# Patient Record
Sex: FEMALE | Race: WHITE | NOT HISPANIC OR LATINO | Employment: OTHER | URBAN - METROPOLITAN AREA
[De-identification: names, ages, dates, MRNs, and addresses within clinical notes are randomized per-mention and may not be internally consistent; named-entity substitution may affect disease eponyms.]

---

## 2017-01-02 ENCOUNTER — HOSPITAL ENCOUNTER (OUTPATIENT)
Dept: RADIOLOGY | Facility: MEDICAL CENTER | Age: 82
End: 2017-01-02

## 2017-01-02 ENCOUNTER — HOSPITAL ENCOUNTER (INPATIENT)
Facility: MEDICAL CENTER | Age: 82
LOS: 7 days | DRG: 604 | End: 2017-01-12
Attending: HOSPITALIST | Admitting: INTERNAL MEDICINE
Payer: MEDICARE

## 2017-01-02 ENCOUNTER — RESOLUTE PROFESSIONAL BILLING HOSPITAL PROF FEE (OUTPATIENT)
Dept: HOSPITALIST | Facility: MEDICAL CENTER | Age: 82
End: 2017-01-02
Payer: MEDICARE

## 2017-01-02 PROCEDURE — 700102 HCHG RX REV CODE 250 W/ 637 OVERRIDE(OP): Performed by: HOSPITALIST

## 2017-01-02 PROCEDURE — A9270 NON-COVERED ITEM OR SERVICE: HCPCS | Performed by: HOSPITALIST

## 2017-01-02 PROCEDURE — 99220 PR INITIAL OBSERVATION CARE,LEVL III: CPT | Performed by: HOSPITALIST

## 2017-01-02 PROCEDURE — G0378 HOSPITAL OBSERVATION PER HR: HCPCS

## 2017-01-02 RX ORDER — PREDNISONE 1 MG/1
7 TABLET ORAL DAILY
Status: DISCONTINUED | OUTPATIENT
Start: 2017-01-03 | End: 2017-01-04

## 2017-01-02 RX ORDER — ALBUTEROL SULFATE 90 UG/1
2 AEROSOL, METERED RESPIRATORY (INHALATION) EVERY 4 HOURS PRN
Status: DISCONTINUED | OUTPATIENT
Start: 2017-01-02 | End: 2017-01-12 | Stop reason: HOSPADM

## 2017-01-02 RX ORDER — HYDROCODONE BITARTRATE AND ACETAMINOPHEN 5; 325 MG/1; MG/1
1-2 TABLET ORAL EVERY 4 HOURS PRN
Status: DISCONTINUED | OUTPATIENT
Start: 2017-01-02 | End: 2017-01-12 | Stop reason: HOSPADM

## 2017-01-02 RX ORDER — AMOXICILLIN 250 MG
1 CAPSULE ORAL
Status: DISCONTINUED | OUTPATIENT
Start: 2017-01-02 | End: 2017-01-12 | Stop reason: HOSPADM

## 2017-01-02 RX ORDER — AMOXICILLIN 250 MG
1 CAPSULE ORAL NIGHTLY
Status: DISCONTINUED | OUTPATIENT
Start: 2017-01-03 | End: 2017-01-12 | Stop reason: HOSPADM

## 2017-01-02 RX ORDER — FOLIC ACID 1 MG/1
1 TABLET ORAL DAILY
Status: DISCONTINUED | OUTPATIENT
Start: 2017-01-03 | End: 2017-01-12 | Stop reason: HOSPADM

## 2017-01-02 RX ORDER — ONDANSETRON 4 MG/1
4 TABLET, ORALLY DISINTEGRATING ORAL EVERY 4 HOURS PRN
Status: DISCONTINUED | OUTPATIENT
Start: 2017-01-02 | End: 2017-01-12 | Stop reason: HOSPADM

## 2017-01-02 RX ORDER — TOLTERODINE TARTRATE 2 MG/1
2 TABLET, EXTENDED RELEASE ORAL 2 TIMES DAILY
Status: DISCONTINUED | OUTPATIENT
Start: 2017-01-02 | End: 2017-01-12 | Stop reason: HOSPADM

## 2017-01-02 RX ORDER — LACTULOSE 20 G/30ML
30 SOLUTION ORAL
Status: DISCONTINUED | OUTPATIENT
Start: 2017-01-02 | End: 2017-01-12 | Stop reason: HOSPADM

## 2017-01-02 RX ORDER — IPRATROPIUM BROMIDE AND ALBUTEROL SULFATE 2.5; .5 MG/3ML; MG/3ML
3 SOLUTION RESPIRATORY (INHALATION)
Status: DISCONTINUED | OUTPATIENT
Start: 2017-01-02 | End: 2017-01-12 | Stop reason: HOSPADM

## 2017-01-02 RX ORDER — ONDANSETRON 2 MG/ML
4 INJECTION INTRAMUSCULAR; INTRAVENOUS EVERY 4 HOURS PRN
Status: DISCONTINUED | OUTPATIENT
Start: 2017-01-02 | End: 2017-01-12 | Stop reason: HOSPADM

## 2017-01-02 RX ORDER — ACETAMINOPHEN 325 MG/1
650 TABLET ORAL EVERY 6 HOURS PRN
Status: DISCONTINUED | OUTPATIENT
Start: 2017-01-02 | End: 2017-01-12 | Stop reason: HOSPADM

## 2017-01-02 RX ORDER — BISACODYL 10 MG
10 SUPPOSITORY, RECTAL RECTAL
Status: DISCONTINUED | OUTPATIENT
Start: 2017-01-02 | End: 2017-01-12 | Stop reason: HOSPADM

## 2017-01-02 RX ORDER — ENEMA 19; 7 G/133ML; G/133ML
1 ENEMA RECTAL
Status: DISCONTINUED | OUTPATIENT
Start: 2017-01-02 | End: 2017-01-12 | Stop reason: HOSPADM

## 2017-01-02 RX ORDER — DOCUSATE SODIUM 100 MG/1
100 CAPSULE, LIQUID FILLED ORAL EVERY MORNING
Status: DISCONTINUED | OUTPATIENT
Start: 2017-01-03 | End: 2017-01-12 | Stop reason: HOSPADM

## 2017-01-02 RX ADMIN — TOLTERODINE TARTRATE 2 MG: 2 TABLET, FILM COATED ORAL at 23:34

## 2017-01-02 ASSESSMENT — LIFESTYLE VARIABLES
EVER_SMOKED: YES
DO YOU DRINK ALCOHOL: NO

## 2017-01-02 ASSESSMENT — PAIN SCALES - GENERAL: PAINLEVEL_OUTOF10: 0

## 2017-01-02 NOTE — IP AVS SNAPSHOT
" <p align=\"LEFT\"><IMG SRC=\"//[email or web address]\" alt=\"Image\" WIDTH=\"50%\" HEIGHT=\"200\" BORDER=\"\"></p>                   Name:Rosey Walker  Medical Record Number:[id number]  CSN: [id number]    YOB: 1926   Age: 89 y.o.  Sex: female  HT:1.549 m (5' 0.98\") WT: 37 kg (81 lb 9.1 oz)          Admit Date: 1/2/2017     Discharge Date:   Today's Date: 1/12/2017  Attending Doctor:  CIERRA Oleary*                  Allergies:  Anectine          Your appointments     Mar 08, 2017  2:00 PM   Established Patient Pul with MARCUS Garcia   [facility] Pulmonary Medicine (--)    [address] [facility] [address]  [facility] [id number] [phone]                 Medication List      Take these Medications        Instructions    acetaminophen 325 MG Tabs   Commonly known as:  TYLENOL    Take 2 Tabs by mouth every 6 hours as needed (Mild Pain; (Pain scale 1-3); Temp greater than 100.5 F).   Dose:  650 mg       COMBIVENT RESPIMAT  MCG/ACT Aers   Generic drug:  ipratropium-albuterol    Inhale 1 Puff by mouth 4 times a day as needed (SOB, COUGH, WHEEZE.  DO NOT EXCEED 6 PUFFS IN 24 HRS.).   Dose:  1 Puff       enoxaparin 30 MG/0.3ML Soln inj   Commonly known as:  LOVENOX    Inject 0.3 mL as instructed every day.   Dose:  30 mg       FOLIC ACID PO    Take  by mouth every day.       megestrol 40 MG/ML Susp   Commonly known as:  MEGACE    Take 20 mL by mouth every day.   Dose:  800 mg       methotrexate 2.5 MG tablet   Commonly known as:  RHEUMATREX    Take 15 mg by mouth every 7 days.   Dose:  15 mg       NORCO 5-325 MG Tabs per tablet   Generic drug:  hydrocodone-acetaminophen    Take 1-2 Tabs by mouth every four hours as needed.   Dose:  1-2 Tab       ondansetron 4 MG Tbdp   Commonly known as:  ZOFRAN ODT    Take 1 Tab by mouth every four hours as needed for Nausea/Vomiting (give PO if IV route is unavailable. May give per feeding tube.).   Dose:  4 mg       predniSONE 10 MG Tabs   What changed:  how " much to take   Commonly known as:  DELTASONE    Take 0.5 Tabs by mouth every day.   Dose:  7 mg       tolterodine 2 MG Tabs   What changed:  when to take this   Commonly known as:  DETROL    Take 1 Tab by mouth 2 Times a Day.   Dose:  2 mg       VENTOLIN  (90 BASE) MCG/ACT Aers inhalation aerosol   Generic drug:  albuterol    Inhale 2 Puffs by mouth every four hours as needed for Shortness of Breath.   Dose:  2 Puff

## 2017-01-02 NOTE — IP AVS SNAPSHOT
1/12/2017          Rosey Walker  [address]  [facility] [address]    Dear Rosey:    [facility] wants to ensure your discharge home is safe and you or your loved ones have had all your questions answered regarding your care after you leave the hospital.    You may receive a telephone call within two days of your discharge.  This call is to make certain you understand your discharge instructions as well as ensure we provided you with the best care possible during your stay with us.     The call will only last approximately 3-5 minutes and will be done by a nurse.    Once again, we want to ensure your discharge home is safe and that you have a clear understanding of any next steps in your care.  If you have any questions or concerns, please do not hesitate to contact us, we are here for you.  Thank you for choosing [facility] for your healthcare needs.    Sincerely,    Americo Norwood    [facility]

## 2017-01-02 NOTE — LETTER
[facility] ([facility]) - [address]  EHR eReferral Notification Requirements    To be sent by secure email to [email or web address]   by fax to [phone]       FIELDS ARE REQUIRED TO BE COMPLETED     Patient Name:  Rosey Walker  MRN:  [id number]   Account Number: [id number]                YOB: 1926    Date Roomed in ED:         Date First Observation Order Placed: 1/2/2017   9:18 PM  Date First Inpatient Order Placed:        Date of Previous Admission (Needed For Readmission Reviews Only):     Discharge Date and Time (if applicable): No discharge date for patient encounter.     PLEASE CHECK OFF TYPE OF REVIEW & CURRENT ADMISSION STATUS FROM LISTS BELOW  Type of Review:  Continued stay review    Dates to be Reviewed: 01/02/2016        Current Admission Status:  Observation-Outpatient    / Contact Number for EHR outcome/recommendation call:    Wenceslao Roldan [phone]     Attending Physician/ Contact Number (if not the same as in electronic record):  Addy     Comments:  ***      Additional Information being Emailed or Faxed:  {YES/NO:63}       Fax Handwritten Supporting Documents to EHR at [phone]      [address] [address]  [phone]  [email or web address]    Updated December 17, 2014

## 2017-01-02 NOTE — IP AVS SNAPSHOT
" After Visit Summary                                                                                                                  Name:Rosey Walker  Medical Record Number:[id number]  CSN: [id number]    YOB: 1926   Age: 89 y.o.  Sex: female  HT:1.549 m (5' 0.98\") WT: 37 kg (81 lb 9.1 oz)          Admit Date: 1/2/2017     Discharge Date:   Today's Date: 1/12/2017  Attending Doctor:  CIERRA Oleary*                  Allergies:  Anectine            Discharge Instructions       Discharge Instructions    Discharged to other by medical transportation with escort. Discharged via wheelchair, hospital escort: Yes.  Special equipment needed: Walker    Be sure to schedule a follow-up appointment with your primary care doctor or any specialists as instructed.     Discharge Plan:   Diet Plan: Discussed  Activity Level: Discussed  Confirmed Follow up Appointment: Patient to Call and Schedule Appointment  Confirmed Symptoms Management: Discussed  Medication Reconciliation Updated: Yes  Influenza Vaccine Indication: Not indicated: Previously immunized this influenza season and > 8 years of age    I understand that a diet low in cholesterol, fat, and sodium is recommended for good health. Unless I have been given specific instructions below for another diet, I accept this instruction as my diet prescription.   Other diet: As tolerated      Special Instructions: Discharge instructions for the Orthopedic Patient    Follow up with Primary Care Physician within 2 weeks of discharge to home, regarding:  Review of medications and diagnostic testing.  Surveillance for medical complications.  Workup and treatment of osteoporosis, if appropriate.     -Is this a Joint Replacement patient? No    -Is this patient being discharged with medication to prevent blood clots?  Yes, Lovenox Enoxaparin injection  What is this medicine?  ENOXAPARIN (ee nox a PA rin) is used after knee, hip, or abdominal surgeries to prevent blood " clotting. It is also used to treat existing blood clots in the lungs or in the veins.  This medicine may be used for other purposes; ask your health care provider or pharmacist if you have questions.  COMMON BRAND NAME(S): Lovenox  What should I tell my health care provider before I take this medicine?  They need to know if you have any of these conditions:  -bleeding disorders, hemorrhage, or hemophilia  -infection of the heart or heart valves  -kidney or liver disease  -previous stroke  -prosthetic heart valve  -recent surgery or delivery of a baby  -ulcer in the stomach or intestine, diverticulitis, or other bowel disease  -an unusual or allergic reaction to enoxaparin, heparin, pork or pork products, other medicines, foods, dyes, or preservatives  -pregnant or trying to get pregnant  -breast-feeding  How should I use this medicine?  This medicine is for injection under the skin. It is usually given by a health-care professional. You or a family member may be trained on how to give the injections. If you are to give yourself injections, make sure you understand how to use the syringe, measure the dose if necessary, and give the injection. To avoid bruising, do not rub the site where this medicine has been injected. Do not take your medicine more often than directed. Do not stop taking except on the advice of your doctor or health care professional.  Make sure you receive a puncture-resistant container to dispose of the needles and syringes once you have finished with them. Do not reuse these items. Return the container to your doctor or health care professional for proper disposal.  Talk to your pediatrician regarding the use of this medicine in children. Special care may be needed.  Overdosage: If you think you have taken too much of this medicine contact a poison control center or emergency room at once.  NOTE: This medicine is only for you. Do not share this medicine with others.  What if I miss a dose?  If  you miss a dose, take it as soon as you can. If it is almost time for your next dose, take only that dose. Do not take double or extra doses.  What may interact with this medicine?  Do not take this medicine with any of the following medications:  -aspirin and aspirin-like medicines  -heparin  -mifepristone  -palifermin  -warfarin   This medicine may also interact with the following medications:  -cilostazol  -clopidogrel  -dipyridamole  -NSAIDs, medicines for pain and inflammation, like ibuprofen or naproxen  -sulfinpyrazone  -ticlopidine  This list may not describe all possible interactions. Give your health care provider a list of all the medicines, herbs, non-prescription drugs, or dietary supplements you use. Also tell them if you smoke, drink alcohol, or use illegal drugs. Some items may interact with your medicine.  What should I watch for while using this medicine?  Visit your doctor or health care professional for regular checks on your progress. Your condition will be monitored carefully while you are receiving this medicine.  If you are going to have surgery, tell your doctor or health care professional that you are taking this medicine.  Do not stop taking this medicine without first talking to your doctor. Be sure to refill your prescription before you run out of medicine.  Avoid sports and activities that might cause injury while you are using this medicine. Severe falls or injuries can cause unseen bleeding. Be careful when using sharp tools or knives. Consider using an electric razor. Take special care brushing or flossing your teeth. Report any injuries, bruising, or red spots on the skin to your doctor or health care professional.  What side effects may I notice from receiving this medicine?  Side effects that you should report to your doctor or health care professional as soon as possible:  -allergic reactions like skin rash, itching or hives, swelling of the face, lips, or tongue  -dark  urine  -feeling faint or lightheaded, falls  -fever  -heavy menstrual bleeding  -signs and symptoms of bleeding such as bloody or black, tarry stools; red or dark-brown urine; spitting up blood or brown material that looks like coffee grounds; red spots on the skin; unusual bruising or bleeding from the eye, gums, or nose  -signs and symptoms of a blood clot such as breathing problems; changes in vision; chest pain; severe, sudden headache; pain, swelling, warmth in the leg; trouble speaking; sudden numbness or weakness of the face, arm or leg  Side effects that usually do not require medical attention (report to your doctor or health care professional if they continue or are bothersome):  -pain or irritation at the injection site  This list may not describe all possible side effects. Call your doctor for medical advice about side effects. You may report side effects to FDA at 5-714-FDA-3100.  Where should I keep my medicine?  Keep out of the reach of children.  Store at room temperature between 15 and 30 degrees C (59 and 86 degrees F). Do not freeze. If your injections have been specially prepared, you may need to store them in the refrigerator. Ask your pharmacist. Throw away any unused medicine after the expiration date.  NOTE: This sheet is a summary. It may not cover all possible information. If you have questions about this medicine, talk to your doctor, pharmacist, or health care provider.  © 2014, [employer]vier/Gold Standard. (3/31/2014 10:04:27 AM)      · Is patient discharged on Warfarin / Coumadin?   No     · Is patient Post Blood Transfusion?  No    Depression / Suicide Risk    As you are discharged from this [facility] facility, it is important to learn how to keep safe from harming yourself.    Recognize the warning signs:  · Abrupt changes in personality, positive or negative- including increase in energy   · Giving away possessions  · Change in eating patterns- significant weight changes-  positive or  negative  · Change in sleeping patterns- unable to sleep or sleeping all the time   · Unwillingness or inability to communicate  · Depression  · Unusual sadness, discouragement and loneliness  · Talk of wanting to die  · Neglect of personal appearance   · Rebelliousness- reckless behavior  · Withdrawal from people/activities they love  · Confusion- inability to concentrate     If you or a loved one observes any of these behaviors or has concerns about self-harm, here's what you can do:  · Talk about it- your feelings and reasons for harming yourself  · Remove any means that you might use to hurt yourself (examples: pills, rope, extension cords, firearm)  · Get professional help from the community (Mental Health, Substance Abuse, psychological counseling)  · Do not be alone:Call your Safe Contact- someone whom you trust who will be there for you.  · Call your local CRISIS HOTLINE 331-0649 or 596-921-2954  · Call your local Children's Mobile Crisis Response Team [place] (113) 580-9111 or www.[employer]  · Call the toll free National Suicide Prevention Hotlines   · National Suicide Prevention Lifeline 746-156-GYZK (3970)  · National Hope Line Network 800-SUICIDE (749-5823)        Your appointments     Mar 08, 2017  2:00 PM   Established Patient Pul with MARCUS Garcia   [facility] Pulmonary Medicine (--)    [address] [facility] [address]  [facility] [address] [phone]                 Discharge Medication Instructions:    Below are the medications your physician expects you to take upon discharge:    Review all your home medications and newly ordered medications with your doctor and/or pharmacist. Follow medication instructions as directed by your doctor and/or pharmacist.    Please keep your medication list with you and share with your physician.               Medication List      START taking these medications        Instructions    acetaminophen 325 MG Tabs   Last time this was given:  650 mg  on 1/8/2017  2:06 PM   Commonly known as:  TYLENOL    Take 2 Tabs by mouth every 6 hours as needed (Mild Pain; (Pain scale 1-3); Temp greater than 100.5 F).   Dose:  650 mg       enoxaparin 30 MG/0.3ML Soln inj   Last time this was given:  30 mg on 1/12/2017  9:00 AM   Commonly known as:  LOVENOX    Inject 0.3 mL as instructed every day.   Dose:  30 mg       megestrol 40 MG/ML Susp   Last time this was given:  800 mg on 1/12/2017  9:00 AM   Commonly known as:  MEGACE    Take 20 mL by mouth every day.   Dose:  800 mg       ondansetron 4 MG Tbdp   Commonly known as:  ZOFRAN ODT    Take 1 Tab by mouth every four hours as needed for Nausea/Vomiting (give PO if IV route is unavailable. May give per feeding tube.).   Dose:  4 mg         CHANGE how you take these medications        Instructions    predniSONE 10 MG Tabs   What changed:  how much to take   Last time this was given:  8 mg on 1/12/2017  9:00 AM   Commonly known as:  DELTASONE    Take 0.5 Tabs by mouth every day.   Dose:  7 mg       tolterodine 2 MG Tabs   What changed:  when to take this   Last time this was given:  2 mg on 1/12/2017  9:00 AM   Commonly known as:  DETROL    Take 1 Tab by mouth 2 Times a Day.   Dose:  2 mg         CONTINUE taking these medications        Instructions    COMBIVENT RESPIMAT  MCG/ACT Aers   Generic drug:  ipratropium-albuterol    Inhale 1 Puff by mouth 4 times a day as needed (SOB, COUGH, WHEEZE.  DO NOT EXCEED 6 PUFFS IN 24 HRS.).   Dose:  1 Puff       FOLIC ACID PO   Last time this was given:  1 mg on 1/12/2017  9:00 AM    Take  by mouth every day.       methotrexate 2.5 MG tablet   Commonly known as:  RHEUMATREX    Take 15 mg by mouth every 7 days.   Dose:  15 mg       NORCO 5-325 MG Tabs per tablet   Generic drug:  hydrocodone-acetaminophen    Take 1-2 Tabs by mouth every four hours as needed.   Dose:  1-2 Tab       VENTOLIN  (90 BASE) MCG/ACT Aers inhalation aerosol   Generic drug:  albuterol    Inhale 2 Puffs by  mouth every four hours as needed for Shortness of Breath.   Dose:  2 Puff               Instructions           Diet / Nutrition:    Follow any diet instructions given to you by your doctor or the dietician, including how much salt (sodium) you are allowed each day.    If you are overweight, talk to your doctor about a weight reduction plan.    Activity:    Remain physically active following your doctor's instructions about exercise and activity.    Rest often.     Any time you become even a little tired or short of breath, SIT DOWN and rest.    Worsening Symptoms:    Report any of the following signs and symptoms to the doctor's office immediately:    *Pain of jaw, arm, or neck  *Chest pain not relieved by medication                               *Dizziness or loss of consciousness  *Difficulty breathing even when at rest   *More tired than usual                                       *Bleeding drainage or swelling of surgical site  *Swelling of feet, ankles, legs or stomach                 *Fever (>100ºF)  *Pink or blood tinged sputum  *Weight gain (3lbs/day or 5lbs /week)           *Shock from internal defibrillator (if applicable)  *Palpitations or irregular heartbeats                *Cool and/or numb extremities    Stroke Awareness    Common Risk Factors for Stroke include:    Age  Atrial Fibrillation  Carotid Artery Stenosis  Diabetes Mellitus  Excessive alcohol consumption  High blood pressure  Overweight   Physical inactivity  Smoking    Warning signs and symptoms of a stroke include:    *Sudden numbness or weakness of the face, arm or leg (especially on one side of the body).  *Sudden confusion, trouble speaking or understanding.  *Sudden trouble seeing in one or both eyes.  *Sudden trouble walking, dizziness, loss of balance or coordination.Sudden severe headache with no known cause.    It is very important to get treatment quickly when a stroke occurs. If you experience any of the above warning signs, call  911 immediately.                   Disclaimer         Quit Smoking / Tobacco Use:    I understand the use of any tobacco products increases my chance of suffering from future heart disease or stroke and could cause other illnesses which may shorten my life. Quitting the use of tobacco products is the single most important thing I can do to improve my health. For further information on smoking / tobacco cessation call a Toll Free Quit Line at 1-729.841.2504 (*National [employer] [place]) or 1-188.951.9061 (American Lung Association) or you can access the web based program at www.lungusa.org.    [place] Tobacco Users Help Line:  (815) 142-1993       Toll Free: 1-338.196.8873  Quit Tobacco Program [facility] (384)618-9995    Crisis Hotline:    [place] Crisis Hotline:  4-593-IPZALFF or 1-202.652.6824    [place] Crisis Hotline:    1-138.606.9674 or 875-321-9541    Discharge Survey:   Thank you for choosing [facility]. We hope we did everything we could to make your hospital stay a pleasant one. You may be receiving a phone survey and we would appreciate your time and participation in answering the questions. Your input is very valuable to us in our efforts to improve our service to our patients and their families.        My signature on this form indicates that:    1. I have reviewed and understand the above information.  2. My questions regarding this information have been answered to my satisfaction.  3. I have formulated a plan with my discharge nurse to obtain my prescribed medications for home.                  Disclaimer         __________________________________                     __________       ________                       Patient Signature                                                 Date                    Time

## 2017-01-02 NOTE — IP AVS SNAPSHOT
[employer] [facility] Access Code: [email or web address]  Expires: 2/11/2017 11:23 AM    Your email address is not on file at [employer].  Email Addresses are required for you to sign up for [facility], please contact [phone] to verify your personal information and to provide your email address prior to attempting to register for [employer].    Rosey Walker   [address]  [place] [address]    [employer] [facility]  A secure, online tool to manage your health information     [employer]’s [employer]® is a secure, online tool that connects you to your personalized health information from the privacy of your home -- day or night - making it very easy for you to manage your healthcare. Once the activation process is completed, you can even access your medical information using the [facility] [employer] sandy, which is available for free in the Apple Sandy store or Google Play store.     To learn more about [employer], visit [email or web address]    There are two levels of access available (as shown below):   My Chart Features  [facility] Primary Care Doctor [facility]  Specialists [facility]  Urgent  Care Non-[facility] Primary Care Doctor   Email your healthcare team securely and privately 24/7 X X X    Manage appointments: schedule your next appointment; view details of past/upcoming appointments X      Request prescription refills. X      View recent personal medical records, including lab and immunizations X X X X   View health record, including health history, allergies, medications X X X X   Read reports about your outpatient visits, procedures, consult and ER notes X X X X   See your discharge summary, which is a recap of your hospital and/or ER visit that includes your diagnosis, lab results, and care plan X X  X     How to register for [facility] [facility]:  Once your e-mail address has been verified, follow the following steps to sign up for [employer] [facility].     1. Go to  [email or web address]  2. Click on the Sign Up Now box, which takes you to the New Member Sign Up page. You will  need to provide the following information:  a. Enter your [employer] Access Code exactly as it appears at the top of this page. (You will not need to use this code after you’ve completed the sign-up process. If you do not sign up before the expiration date, you must request a new code.)   b. Enter your date of birth.   c. Enter your home email address.   d. Click Submit, and follow the next screen’s instructions.  3. Create a Tam[employer] ID. This will be your [employer] login ID and cannot be changed, so think of one that is secure and easy to remember.  4. Create a [employer] password. You can change your password at any time.  5. Enter your Password Reset Question and Answer. This can be used at a later time if you forget your password.   6. Enter your e-mail address. This allows you to receive e-mail notifications when new information is available in [employer].  7. Click Sign Up. You can now view your health information.    For assistance activating your [employer] account, call [phone]

## 2017-01-02 NOTE — LETTER
[facility] ([facility]) - [phone]  EHR eReferral Notification Requirements    To be sent by secure email to [email or web address] or   by fax to [phone]       FIELDS ARE REQUIRED TO BE COMPLETED     Patient Name:  Rosey Walker  MRN:  [id number]   Account Number: [id number]                YOB: 1926    Date Roomed in ED:         Date First Observation Order Placed: 1/2/2017   9:18 PM  Date First Inpatient Order Placed:        Date of Previous Admission (Needed For Readmission Reviews Only):     Discharge Date and Time (if applicable): No discharge date for patient encounter.     PLEASE CHECK OFF TYPE OF REVIEW & CURRENT ADMISSION STATUS FROM LISTS BELOW  Type of Review:  Admission review    Dates to be Reviewed: 1/2/16 to 1/5/16        Current Admission Status:  Observation-Outpatient    / Contact Number for EHR outcome/recommendation call:    Wenceslao Roldan [phone]     Attending Physician/ Contact Number (if not the same as in electronic record):  Addy     Comments:        Additional Information being Emailed or Faxed:  no       Fax Handwritten Supporting Documents to EHR at [phone]      [address] [address]  [phone]  [email or web address]    Updated December 17, 2014

## 2017-01-03 ENCOUNTER — APPOINTMENT (OUTPATIENT)
Dept: RADIOLOGY | Facility: MEDICAL CENTER | Age: 82
DRG: 604 | End: 2017-01-03
Attending: INTERNAL MEDICINE
Payer: MEDICARE

## 2017-01-03 ENCOUNTER — HOSPITAL ENCOUNTER (OUTPATIENT)
Dept: RADIOLOGY | Facility: MEDICAL CENTER | Age: 82
End: 2017-01-03

## 2017-01-03 PROBLEM — J44.9 COPD (CHRONIC OBSTRUCTIVE PULMONARY DISEASE) (HCC): Status: ACTIVE | Noted: 2017-01-03

## 2017-01-03 PROBLEM — S81.811A SKIN TEAR OF RIGHT LOWER LEG WITHOUT COMPLICATION: Status: ACTIVE | Noted: 2017-01-03

## 2017-01-03 PROBLEM — Z87.39 H/O RHEUMATOID ARTHRITIS: Status: ACTIVE | Noted: 2017-01-03

## 2017-01-03 PROBLEM — J96.21 ACUTE ON CHRONIC RESPIRATORY FAILURE WITH HYPOXIA (HCC): Status: ACTIVE | Noted: 2017-01-03

## 2017-01-03 LAB
ANION GAP SERPL CALC-SCNC: 7 MMOL/L (ref 0–11.9)
BASOPHILS # BLD AUTO: 0.8 % (ref 0–1.8)
BASOPHILS # BLD: 0.05 K/UL (ref 0–0.12)
BUN SERPL-MCNC: 25 MG/DL (ref 8–22)
CALCIUM SERPL-MCNC: 8.8 MG/DL (ref 8.5–10.5)
CHLORIDE SERPL-SCNC: 101 MMOL/L (ref 96–112)
CO2 SERPL-SCNC: 32 MMOL/L (ref 20–33)
CREAT SERPL-MCNC: 0.66 MG/DL (ref 0.5–1.4)
EOSINOPHIL # BLD AUTO: 0.31 K/UL (ref 0–0.51)
EOSINOPHIL NFR BLD: 4.8 % (ref 0–6.9)
ERYTHROCYTE [DISTWIDTH] IN BLOOD BY AUTOMATED COUNT: 47.5 FL (ref 35.9–50)
GFR SERPL CREATININE-BSD FRML MDRD: >60 ML/MIN/1.73 M 2
GLUCOSE SERPL-MCNC: 66 MG/DL (ref 65–99)
HCT VFR BLD AUTO: 39.2 % (ref 37–47)
HGB BLD-MCNC: 12.5 G/DL (ref 12–16)
IMM GRANULOCYTES # BLD AUTO: 0.03 K/UL (ref 0–0.11)
IMM GRANULOCYTES NFR BLD AUTO: 0.5 % (ref 0–0.9)
LYMPHOCYTES # BLD AUTO: 0.75 K/UL (ref 1–4.8)
LYMPHOCYTES NFR BLD: 11.7 % (ref 22–41)
MCH RBC QN AUTO: 31.8 PG (ref 27–33)
MCHC RBC AUTO-ENTMCNC: 31.9 G/DL (ref 33.6–35)
MCV RBC AUTO: 99.7 FL (ref 81.4–97.8)
MONOCYTES # BLD AUTO: 0.5 K/UL (ref 0–0.85)
MONOCYTES NFR BLD AUTO: 7.8 % (ref 0–13.4)
NEUTROPHILS # BLD AUTO: 4.79 K/UL (ref 2–7.15)
NEUTROPHILS NFR BLD: 74.4 % (ref 44–72)
NRBC # BLD AUTO: 0 K/UL
NRBC BLD AUTO-RTO: 0 /100 WBC
PLATELET # BLD AUTO: 221 K/UL (ref 164–446)
PMV BLD AUTO: 9.2 FL (ref 9–12.9)
POTASSIUM SERPL-SCNC: 3.9 MMOL/L (ref 3.6–5.5)
RBC # BLD AUTO: 3.93 M/UL (ref 4.2–5.4)
SODIUM SERPL-SCNC: 140 MMOL/L (ref 135–145)
WBC # BLD AUTO: 6.4 K/UL (ref 4.8–10.8)

## 2017-01-03 PROCEDURE — A6212 FOAM DRG <=16 SQ IN W/BORDER: HCPCS | Performed by: HOSPITALIST

## 2017-01-03 PROCEDURE — 71010 DX-CHEST-PORTABLE (1 VIEW): CPT

## 2017-01-03 PROCEDURE — 700112 HCHG RX REV CODE 229: Performed by: HOSPITALIST

## 2017-01-03 PROCEDURE — A9270 NON-COVERED ITEM OR SERVICE: HCPCS | Performed by: HOSPITALIST

## 2017-01-03 PROCEDURE — 85025 COMPLETE CBC W/AUTO DIFF WBC: CPT

## 2017-01-03 PROCEDURE — 700111 HCHG RX REV CODE 636 W/ 250 OVERRIDE (IP): Performed by: HOSPITALIST

## 2017-01-03 PROCEDURE — 94760 N-INVAS EAR/PLS OXIMETRY 1: CPT

## 2017-01-03 PROCEDURE — 99226 PR SUBSEQUENT OBSERVATION CARE,LEVEL III: CPT | Performed by: INTERNAL MEDICINE

## 2017-01-03 PROCEDURE — 36415 COLL VENOUS BLD VENIPUNCTURE: CPT

## 2017-01-03 PROCEDURE — 80048 BASIC METABOLIC PNL TOTAL CA: CPT

## 2017-01-03 PROCEDURE — G0378 HOSPITAL OBSERVATION PER HR: HCPCS

## 2017-01-03 PROCEDURE — 700102 HCHG RX REV CODE 250 W/ 637 OVERRIDE(OP): Performed by: HOSPITALIST

## 2017-01-03 RX ORDER — PREDNISONE 5 MG/1
5 TABLET ORAL 2 TIMES DAILY
Status: ON HOLD | COMMUNITY
End: 2017-01-11

## 2017-01-03 RX ORDER — TOLTERODINE 4 MG/1
4 CAPSULE, EXTENDED RELEASE ORAL DAILY
Status: ON HOLD | COMMUNITY
End: 2017-01-11

## 2017-01-03 RX ADMIN — DOCUSATE SODIUM 100 MG: 100 CAPSULE ORAL at 10:17

## 2017-01-03 RX ADMIN — ENOXAPARIN SODIUM 30 MG: 100 INJECTION SUBCUTANEOUS at 10:07

## 2017-01-03 RX ADMIN — FOLIC ACID 1 MG: 1 TABLET ORAL at 10:06

## 2017-01-03 RX ADMIN — TOLTERODINE TARTRATE 2 MG: 2 TABLET, FILM COATED ORAL at 10:06

## 2017-01-03 RX ADMIN — Medication 1 TABLET: at 20:48

## 2017-01-03 RX ADMIN — TOLTERODINE TARTRATE 2 MG: 2 TABLET, FILM COATED ORAL at 20:48

## 2017-01-03 RX ADMIN — PREDNISONE 7 MG: 1 TABLET ORAL at 11:03

## 2017-01-03 ASSESSMENT — COPD QUESTIONNAIRES
HAVE YOU SMOKED AT LEAST 100 CIGARETTES IN YOUR ENTIRE LIFE: YES
DURING THE PAST 4 WEEKS HOW MUCH DID YOU FEEL SHORT OF BREATH: NONE/LITTLE OF THE TIME
DO YOU EVER COUGH UP ANY MUCUS OR PHLEGM?: YES, A FEW DAYS A WEEK OR MONTH
DO YOU EVER COUGH UP ANY MUCUS OR PHLEGM?: YES, A FEW DAYS A WEEK OR MONTH
COPD SCREENING SCORE: 6
HAVE YOU SMOKED AT LEAST 100 CIGARETTES IN YOUR ENTIRE LIFE: YES
COPD SCREENING SCORE: 6
DURING THE PAST 4 WEEKS HOW MUCH DID YOU FEEL SHORT OF BREATH: NONE/LITTLE OF THE TIME

## 2017-01-03 ASSESSMENT — PAIN SCALES - GENERAL
PAINLEVEL_OUTOF10: 0
PAINLEVEL_OUTOF10: 0

## 2017-01-03 ASSESSMENT — ENCOUNTER SYMPTOMS
SHORTNESS OF BREATH: 0
FEVER: 0
HEADACHES: 0
COUGH: 0
DIZZINESS: 0
MYALGIAS: 0
BLOOD IN STOOL: 0
DIARRHEA: 0
WEAKNESS: 0
VOMITING: 0
SORE THROAT: 0
HALLUCINATIONS: 0
HEARTBURN: 0
ABDOMINAL PAIN: 0
DEPRESSION: 0
NAUSEA: 0
CHILLS: 0
BACK PAIN: 0
FOCAL WEAKNESS: 0
PALPITATIONS: 0

## 2017-01-03 ASSESSMENT — LIFESTYLE VARIABLES: EVER_SMOKED: YES

## 2017-01-03 NOTE — PROGRESS NOTES
Direct admit from [facility] ([place]), Dr. Alejo, [phone].  Accepted by Dr. Saenz for Shortness of breath.  VS:  106/69 mmHg; 103 hr; 28 rr; 96% on 4 L/min O2 per n/c.  ADT signed & held @ 5205, needs to be released upon pt arrival.  No written orders received.  Pt coming by ground.

## 2017-01-03 NOTE — H&P
PRIMARY CARE PHYSICIAN:  In [place].    IDENTIFICATION:  A 89-year-old female.    CHIEF COMPLAINT:  Direct admit from [facility] for swelling and   skin tear.    HISTORY OF PRESENT ILLNESS:  This is a 89-year-old female with history of   end-stage interstitial fibrosis, on chronic prednisone who had a fall at home   and suffered a significant skin tear on the lateral edge of her right leg.    She lives alone and her daughter visits her daily, but her daughter is getting   to see a specialist for her shoulders that she is not able to have her mom   home for the next day or so.  Patient is a poor historian.  She had some pain   medicines so she is a little bit confused.  She was able to tell me that it   was a mechanical fall.  She normally walks without a walker.  She is currently   short of breath on 4 liters nasal cannula.    REVIEW OF SYSTEMS:  Unobtainable at this time as patient is rather confused.    ALLERGIES:  ANECTINE.    MEDICATIONS:  1.  Methotrexate 15 mg every 7 days.  2.  Prednisone 7 mg daily.  3.  Detrol 2 mg b.i.d., also possibly Norco, Combivent, albuterol, and folic   acid, but we are unsure if she is still taking these.    PAST MEDICAL HISTORY:  Include:  1.  Rheumatoid arthritis.  2.  Constipation.  3.  Interstitial fibrosis.  4.  Herpes zoster.  5.  Arthritis.  6.  Chronic respiratory failure with hypoxia, on 4 liters nasal cannula.  7.  History of rib fractures.    PAST SURGICAL HISTORY:  1.  Appendectomy.  2.  Cataract surgery.  3.  Tonsillectomy and adenoidectomy.    SOCIAL HISTORY:  She lives alone.  Her daughter checks on her daily.  She does   not use alcohol or drugs.  No smoking.    PHYSICAL EXAMINATION:  VITAL SIGNS:  Temperature 96.8, blood pressure 119/64, pulse 94, respirations   16, and pulse ox 94% on 4 liters nasal cannula.  GENERAL APPEARANCE:  Well-developed, well-nourished elderly female, in no   acute distress, mildly confused.  HEENT:  KEITH EWING.  DALTONMI.   Oropharynx is clear.  NECK:  Soft.  No thyromegaly, no supraclavicular or cervical adenopathy.  CARDIOVASCULAR:  Slightly tachycardic, but regular rhythm.  She does have a   III/VI systolic ejection murmur.  Normal S1, S2.  LUNGS:  With dry crackles throughout.  Normal chest expansion on inhalation.  ABDOMEN:  Soft, nontender, and nondistended.  Positive bowel sounds   throughout.  EXTREMITIES:  No cyanosis, clubbing or edema.  SKIN:  She does have wrapped right lower leg.  She has a large skin tear on   the lateral aspect of the leg.  That is painful to the touch.  NEUROLOGIC:  She is mildly confused, but cranial nerves II-XII appear to be   grossly intact.  No focal deficits noted.    LABORATORY DATA:  Her CBC was unremarkable as was her chemistry.  White count   was 8, but she did have 22% bands.    IMAGING:  Tib-fib x-ray was negative, showed a soft tissue wound lateral right   middle lower leg with no radiopaque foreign bodies identified.  CT chest with   IV contrast shows severe chronic pulmonary fibrosis, no obvious acute   rib fractures, severe osteoarthritic changes, bilateral shoulders.    PROBLEM LIST:  1.  Follow with skin tear.  2.  Acute pain from her fall and inability to care for herself currently.  3.  Bandemia, unknown significance.  4.  Other chronic problems listed above.    MEDICAL DECISION MAKIN.  For her fall, it was mechanical.  She did not have any syncopal event.    She has a large skin tear, which I will plan a wound consult for.  We will   give her supportive care and pain control.  2.  For her bandemia.  This is likely from her steroid use.  We will recheck   this in the morning.  She does not have any white blood cell count elevation   or fever.  3.  For her other chronic medical problems.  I will continue her home   medications.  4.  Prophylaxis will be subQ Lovenox and bowel protocol.  5.  Patient is full code for now.       ____________________________________     DAGMAR NORTON  MD ARUNA VASQUES / IVONNE    DD:  01/02/2017 22:37:23  DT:  01/02/2017 23:19:46    D#:  [id number]  Job#:  [id number]

## 2017-01-03 NOTE — PROGRESS NOTES
"Pt A&O x2. Oriented to self and situation, disoriented to time and place. Forgetful, repetitive.     Vitals: /64 mmHg  Pulse 94  Temp(Src) 36 °C (96.8 °F)  Resp 16  Ht 1.549 m (5' 1\")  Wt 36.5 kg (80 lb 7.5 oz)  BMI 15.21 kg/m2  SpO2 94%  Breastfeeding? No    Denies having any pain.     Neuro: ALDANA. Denies new onset of numbness/ tingling.    Cardiac: Denies new onset of chest pain.    Vascular: Pulses 1+ BUE, BLE. No edema noted.    Respiratory: Lungs sound diminished in bases. On 4L O2 NC, baseline. Denies SOB. Cough is strong, non-productive.    GI: Abdomen soft. + bowel sounds. On regular diet. - nausea/ vomiting.    : Pt incontinent at times, brief in place.     MSK: Pt ambulated from rney to bed, tolerated well with two person hand-held assist.    Integumentary: Pt has skin tear to RLE lateral, POA dressing in place, no drainage noted. BLE pale, mottled, bruising. Generalized bruising noted throughout.    Labs noted.    Fall precautions in place: Bed locked in lowest position, Upper bed rails up, treaded socks in place, personal belongings within reach, call light within reach, appropriate mobility signs in place, + bed alarm. Pt calls appropriately.     Pt updated on POC. Admit profile complete. Daughter called and updated on pt's location and status.   "

## 2017-01-03 NOTE — PROGRESS NOTES
Med rec partially complete per [facility] [phone].  Patient states her daughter, Luana, helps her with her medications. Left message for Luana to call [phone].

## 2017-01-03 NOTE — DISCHARGE PLANNING
Medical Social Work    SW received call from pt's daughter Luana (ph: [phone]) who states she will be having a procedure at [facility] and will not be available to assist her mother for the next two days. Per IDT rounds, pt is on a respiratory protocol to assist with her shortness of breath. Pt is from [place], [place]. SW will continue to follow pt and assist with d/c planning.

## 2017-01-03 NOTE — PROGRESS NOTES
2 RN skin check performed with PIO Werner.  Blanchable redness noted to elbows bilaterally, heels bilaterally, and sacral area.

## 2017-01-04 ENCOUNTER — APPOINTMENT (OUTPATIENT)
Dept: RADIOLOGY | Facility: MEDICAL CENTER | Age: 82
DRG: 604 | End: 2017-01-04
Attending: INTERNAL MEDICINE
Payer: MEDICARE

## 2017-01-04 LAB
ALBUMIN SERPL BCP-MCNC: 2.5 G/DL (ref 3.2–4.9)
BASOPHILS # BLD AUTO: 0.4 % (ref 0–1.8)
BASOPHILS # BLD: 0.02 K/UL (ref 0–0.12)
BUN SERPL-MCNC: 22 MG/DL (ref 8–22)
CALCIUM SERPL-MCNC: 8.6 MG/DL (ref 8.5–10.5)
CHLORIDE SERPL-SCNC: 99 MMOL/L (ref 96–112)
CO2 SERPL-SCNC: 33 MMOL/L (ref 20–33)
CREAT SERPL-MCNC: 0.7 MG/DL (ref 0.5–1.4)
DEPRECATED D DIMER PPP IA-ACNC: 1657 NG/ML(D-DU)
EOSINOPHIL # BLD AUTO: 0.28 K/UL (ref 0–0.51)
EOSINOPHIL NFR BLD: 5.3 % (ref 0–6.9)
ERYTHROCYTE [DISTWIDTH] IN BLOOD BY AUTOMATED COUNT: 45.6 FL (ref 35.9–50)
GFR SERPL CREATININE-BSD FRML MDRD: >60 ML/MIN/1.73 M 2
GLUCOSE SERPL-MCNC: 78 MG/DL (ref 65–99)
HCT VFR BLD AUTO: 38.3 % (ref 37–47)
HGB BLD-MCNC: 12.6 G/DL (ref 12–16)
IMM GRANULOCYTES # BLD AUTO: 0.02 K/UL (ref 0–0.11)
IMM GRANULOCYTES NFR BLD AUTO: 0.4 % (ref 0–0.9)
LYMPHOCYTES # BLD AUTO: 0.84 K/UL (ref 1–4.8)
LYMPHOCYTES NFR BLD: 15.8 % (ref 22–41)
MCH RBC QN AUTO: 32.4 PG (ref 27–33)
MCHC RBC AUTO-ENTMCNC: 32.9 G/DL (ref 33.6–35)
MCV RBC AUTO: 98.5 FL (ref 81.4–97.8)
MONOCYTES # BLD AUTO: 0.36 K/UL (ref 0–0.85)
MONOCYTES NFR BLD AUTO: 6.8 % (ref 0–13.4)
NEUTROPHILS # BLD AUTO: 3.81 K/UL (ref 2–7.15)
NEUTROPHILS NFR BLD: 71.3 % (ref 44–72)
NRBC # BLD AUTO: 0 K/UL
NRBC BLD AUTO-RTO: 0 /100 WBC
PHOSPHATE SERPL-MCNC: 2.3 MG/DL (ref 2.5–4.5)
PLATELET # BLD AUTO: 209 K/UL (ref 164–446)
PMV BLD AUTO: 9.1 FL (ref 9–12.9)
POTASSIUM SERPL-SCNC: 4.3 MMOL/L (ref 3.6–5.5)
RBC # BLD AUTO: 3.89 M/UL (ref 4.2–5.4)
SODIUM SERPL-SCNC: 135 MMOL/L (ref 135–145)
WBC # BLD AUTO: 5.3 K/UL (ref 4.8–10.8)

## 2017-01-04 PROCEDURE — 94760 N-INVAS EAR/PLS OXIMETRY 1: CPT

## 2017-01-04 PROCEDURE — G8988 SELF CARE GOAL STATUS: HCPCS | Mod: CI

## 2017-01-04 PROCEDURE — A9270 NON-COVERED ITEM OR SERVICE: HCPCS | Performed by: HOSPITALIST

## 2017-01-04 PROCEDURE — 700111 HCHG RX REV CODE 636 W/ 250 OVERRIDE (IP): Performed by: HOSPITALIST

## 2017-01-04 PROCEDURE — 36415 COLL VENOUS BLD VENIPUNCTURE: CPT

## 2017-01-04 PROCEDURE — G8978 MOBILITY CURRENT STATUS: HCPCS | Mod: CJ

## 2017-01-04 PROCEDURE — 85025 COMPLETE CBC W/AUTO DIFF WBC: CPT

## 2017-01-04 PROCEDURE — 99226 PR SUBSEQUENT OBSERVATION CARE,LEVEL III: CPT | Performed by: INTERNAL MEDICINE

## 2017-01-04 PROCEDURE — 700102 HCHG RX REV CODE 250 W/ 637 OVERRIDE(OP): Performed by: HOSPITALIST

## 2017-01-04 PROCEDURE — 97166 OT EVAL MOD COMPLEX 45 MIN: CPT

## 2017-01-04 PROCEDURE — G0378 HOSPITAL OBSERVATION PER HR: HCPCS

## 2017-01-04 PROCEDURE — 700102 HCHG RX REV CODE 250 W/ 637 OVERRIDE(OP): Performed by: INTERNAL MEDICINE

## 2017-01-04 PROCEDURE — 700112 HCHG RX REV CODE 229: Performed by: HOSPITALIST

## 2017-01-04 PROCEDURE — 85379 FIBRIN DEGRADATION QUANT: CPT

## 2017-01-04 PROCEDURE — G8987 SELF CARE CURRENT STATUS: HCPCS | Mod: CK

## 2017-01-04 PROCEDURE — 94640 AIRWAY INHALATION TREATMENT: CPT

## 2017-01-04 PROCEDURE — 700101 HCHG RX REV CODE 250: Performed by: HOSPITALIST

## 2017-01-04 PROCEDURE — 97162 PT EVAL MOD COMPLEX 30 MIN: CPT

## 2017-01-04 PROCEDURE — A9270 NON-COVERED ITEM OR SERVICE: HCPCS | Performed by: INTERNAL MEDICINE

## 2017-01-04 PROCEDURE — 700117 HCHG RX CONTRAST REV CODE 255: Performed by: INTERNAL MEDICINE

## 2017-01-04 PROCEDURE — 80069 RENAL FUNCTION PANEL: CPT

## 2017-01-04 PROCEDURE — 71275 CT ANGIOGRAPHY CHEST: CPT

## 2017-01-04 PROCEDURE — G8979 MOBILITY GOAL STATUS: HCPCS | Mod: CI

## 2017-01-04 RX ORDER — PREDNISONE 1 MG/1
8 TABLET ORAL DAILY
Status: DISCONTINUED | OUTPATIENT
Start: 2017-01-05 | End: 2017-01-12 | Stop reason: HOSPADM

## 2017-01-04 RX ADMIN — DOCUSATE SODIUM 100 MG: 100 CAPSULE ORAL at 09:34

## 2017-01-04 RX ADMIN — Medication 1 TABLET: at 20:19

## 2017-01-04 RX ADMIN — IPRATROPIUM BROMIDE AND ALBUTEROL SULFATE 3 ML: .5; 3 SOLUTION RESPIRATORY (INHALATION) at 23:47

## 2017-01-04 RX ADMIN — DIBASIC SODIUM PHOSPHATE, MONOBASIC POTASSIUM PHOSPHATE AND MONOBASIC SODIUM PHOSPHATE 2 TABLET: 852; 155; 130 TABLET ORAL at 20:18

## 2017-01-04 RX ADMIN — PREDNISONE 7 MG: 1 TABLET ORAL at 11:21

## 2017-01-04 RX ADMIN — DIBASIC SODIUM PHOSPHATE, MONOBASIC POTASSIUM PHOSPHATE AND MONOBASIC SODIUM PHOSPHATE 2 TABLET: 852; 155; 130 TABLET ORAL at 11:20

## 2017-01-04 RX ADMIN — TOLTERODINE TARTRATE 2 MG: 2 TABLET, FILM COATED ORAL at 09:34

## 2017-01-04 RX ADMIN — IOHEXOL 70 ML: 350 INJECTION, SOLUTION INTRAVENOUS at 21:10

## 2017-01-04 RX ADMIN — TOLTERODINE TARTRATE 2 MG: 2 TABLET, FILM COATED ORAL at 20:19

## 2017-01-04 RX ADMIN — ENOXAPARIN SODIUM 30 MG: 100 INJECTION SUBCUTANEOUS at 09:34

## 2017-01-04 RX ADMIN — FOLIC ACID 1 MG: 1 TABLET ORAL at 09:34

## 2017-01-04 ASSESSMENT — ENCOUNTER SYMPTOMS
DIZZINESS: 0
HEADACHES: 0
PALPITATIONS: 0
WEAKNESS: 0
MYALGIAS: 0
NAUSEA: 0
COUGH: 0
BLOOD IN STOOL: 0
FEVER: 0
DEPRESSION: 0
HALLUCINATIONS: 0
FOCAL WEAKNESS: 0
DIARRHEA: 0
ABDOMINAL PAIN: 0
BACK PAIN: 0
HEARTBURN: 0
SHORTNESS OF BREATH: 0
SORE THROAT: 0
VOMITING: 0
CHILLS: 0

## 2017-01-04 ASSESSMENT — GAIT ASSESSMENTS
GAIT LEVEL OF ASSIST: MINIMAL ASSIST
ASSISTIVE DEVICE: FRONT WHEEL WALKER
DISTANCE (FEET): 2

## 2017-01-04 ASSESSMENT — PAIN SCALES - GENERAL
PAINLEVEL_OUTOF10: 0

## 2017-01-04 ASSESSMENT — ACTIVITIES OF DAILY LIVING (ADL): TOILETING: INDEPENDENT

## 2017-01-04 NOTE — DISCHARGE PLANNING
TCN met with patient at bedside to discuss her DC plan. Patient verbalized she was unsure of her DC plan or if she had a PCP as she depends on her daughter to make those decisions. Patient requested TCN contact her daughter but not until tomorrow as her daughter was supposed to be having a procedure as well. Patient was inconsistent in providing details of her PLOF. SW notified and either TCN or SW will follow up tomorrow for DC planning needs. Patient is currently obs with medicare as her payer. Patient DC options are limited to HH at this time. TCN to follow for DC planning.

## 2017-01-04 NOTE — PROGRESS NOTES
Patient alert to self and place, denies SOB, denies pain, vss. Patient appears slightly SOB however denies feeling this way. Continues on home level of 02. Patient O2 sat 88-92% this morning on 4L O2, Dr. daly aware and D-Dimer ordered. D-Dimer elevated, Dr. Daly aware. Per dr. Daly possible CTA to be ordered to r/o PE. Will contninue to monitor. Patient up to chair with PT/OT. Patient with no complaints. Fall risk precations in place. Will monitor.    D-Dimer 0159- Dr. Daly aware.

## 2017-01-04 NOTE — CARE PLAN
Problem: Venous Thromboembolism (VTW)/Deep Vein Thrombosis (DVT) Prevention:  Goal: Patient will participate in Venous Thrombosis (VTE)/Deep Vein Thrombosis (DVT)Prevention Measures  Outcome: PROGRESSING AS EXPECTED

## 2017-01-04 NOTE — PROGRESS NOTES
Hospital Medicine Progress Note, Adult, Complex               Author: Carmen Daly Date & Time created: 1/3/2017  5:07 PM     CC: SOB    Interval History:  89F hx End stage 4L O2 and steroid dependent COPD and ILD and RA admitted after a fall with a R leg skin tear, found to have bandemia.  CXR is clear, she is at her baseline O2 requirement.    Review of Systems:  Review of Systems   Constitutional: Negative for fever, chills and malaise/fatigue.   HENT: Negative for sore throat.    Respiratory: Negative for cough and shortness of breath.    Cardiovascular: Negative for chest pain and palpitations.   Gastrointestinal: Negative for heartburn, nausea, vomiting, abdominal pain, diarrhea and blood in stool.   Genitourinary: Negative for dysuria and frequency.   Musculoskeletal: Negative for myalgias and back pain.   Neurological: Negative for dizziness, focal weakness, weakness and headaches.   Psychiatric/Behavioral: Negative for depression and hallucinations.   All other systems reviewed and are negative.      Physical Exam:  Physical Exam   Constitutional: She is oriented to person, place, and time. She appears well-developed. No distress.   Thin, elderly   HENT:   Head: Normocephalic and atraumatic.   Eyes: Conjunctivae and EOM are normal.   Neck: Normal range of motion. No tracheal deviation present.   Cardiovascular: Normal rate and regular rhythm.    Pulmonary/Chest: No respiratory distress. She has no wheezes. She has no rales.   Abdominal: Soft. She exhibits no distension. There is no tenderness.   Musculoskeletal: She exhibits no edema or tenderness.   Neurological: She is alert and oriented to person, place, and time. No cranial nerve deficit.   Skin: No rash noted. No erythema.   Skin tear, R leg.   Psychiatric: She has a normal mood and affect. Her behavior is normal.       Labs:        Invalid input(s): [id number]      Recent Labs      01/03/17   0652   SODIUM  140   POTASSIUM  3.9   CHLORIDE  101    CO2  32   BUN  25*   CREATININE  0.66   CALCIUM  8.8     Recent Labs      17   0652   GLUCOSE  66     Recent Labs      17   0652   RBC  3.93*   HEMOGLOBIN  12.5   HEMATOCRIT  39.2   PLATELETCT  221     Recent Labs      17   0652   WBC  6.4   NEUTSPOLYS  74.40*   LYMPHOCYTES  11.70*   MONOCYTES  7.80   EOSINOPHILS  4.80   BASOPHILS  0.80           Hemodynamics:  Temp (24hrs), Av.8 °C (98.3 °F), Min:36 °C (96.8 °F), Max:37.6 °C (99.7 °F)  Temperature: 37.6 °C (99.7 °F)  Pulse  Av  Min: 62  Max: 94   Blood Pressure : 136/71 mmHg     Respiratory:    Respiration: 18, Pulse Oximetry: 96 %, O2 Daily Delivery Respiratory : Silicone Nasal Cannula        RUL Breath Sounds: Diminished, RML Breath Sounds: Diminished, RLL Breath Sounds: Crackles, IRLANDA Breath Sounds: Diminished, LLL Breath Sounds: Diminished  Fluids:    Intake/Output Summary (Last 24 hours) at 17 1707  Last data filed at 17 0400   Gross per 24 hour   Intake    240 ml   Output      0 ml   Net    240 ml     Weight: 36.5 kg (80 lb 7.5 oz)  GI/Nutrition:  Orders Placed This Encounter   Procedures   • Diet Order     Standing Status: Standing      Number of Occurrences: 1      Standing Expiration Date:      Order Specific Question:  Diet:     Answer:  Regular [1]     Medical Decision Making, by Problem:     Acute on chronic respiratory failure with hypoxia (HCC) -weaned to her baseline, CT negative for PE  -Recent fall could suggest rib fracture  -Repeat CXR in AM  -Prednisone 7mg daily     ILD (interstitial lung disease) ([facility])   -Prednisone 7mg daily     Skin tear of right lower leg without complication  -Wound care     COPD (chronic obstructive pulmonary disease) ([facility])   -O2 at 4L     H/O rheumatoid arthritis   -On Methotrexate at home  -Resume on AM      Labs reviewed and Medications reviewed  Conrad catheter: No Conrad      DVT Prophylaxis: Enoxaparin (Lovenox)  DVT prophylaxis - mechanical: SCDs  Ulcer prophylaxis: Not  indicated    Assessed for rehab: Patient was assess for and/or received rehabilitation services during this hospitalization

## 2017-01-04 NOTE — CARE PLAN
Problem: Communication  Goal: The ability to communicate needs accurately and effectively will improve  Intervention: [place] patient and significant other/support system to call light to alert staff of needs  Pt will use call bell for assistance

## 2017-01-04 NOTE — WOUND TEAM
"[facility]  Inpatient Services  Initial Wound and Skin Care Evaluation    Admission Date:  1/2/17  HPI, PMH, SH: Reviewed  Unit where seen by Wound Team: [id number]    WOUND CONSULT RELATED TO: evaluation of large skin tear RLE    SUBJECTIVE:  \"I don't know\"    Self Report / Pain Level: painful where dressing stuck to wound bed    OBJECTIVE:   Large skin tear with full tissue loss anterior/lateral RLE    WOUND TYPE, LOCATION, CHARACTERISTICS (Pressure ulcers: location, stage, POA or date identified)    Wound Type/Location: skin tear RLE   Periwound: intact, fragile, discolored     Drainage: small bloody      Tissue Type and %: red 100%     Wound Edges:  attached    Odor:  none     Exposed structure(s):  none   S&S of Infection:  none    Measurements: (taken 1/3/17)    Length: 7cm   Width:  7.5cm   Depth: <0.5cm   Tracts/undermining:  none     INTERVENTIONS BY WOUND TEAM:  Removed old dressing and irrigated wound with NS.  Measurements and photo taken.  Covered wound bed with xeroform and secured with adhesive foam dressing.  Discussed with staff RN.    Interdisciplinary consultation: staff RN, patient    EVALUATION:    Full tissue loss with skin tear which should respond to petrolatum gauze to prevent sticking and foam to absorb and protect wound.      Factors affecting wound healing: age  Goals: wound to decrease in size by 2% weekly    NURSING PLAN OF CARE ORDERS (X):    Dressing changes: See Dressing Maintenance orders:  X  Skin care: See Skin Care orders:   Rectal tube care: See Rectal Tube Care orders:    Other orders:    RSKIN: CURRENT (X) ORDERED (O)  Q shift Too:  X  Q shift pressure point assessments:  X  Atmosair   X      AKUA       Bariatric AKUA       Bariatric foam         Heel float boots        Heels floated on pillows  X     Barrier wipes       Barrier Cream       Barrier paste    Sacral silicone dressing      Padded O2 tubing       Anchorfast       Trach with Optifoam split foam      "   Waffle cushion       Rectal tube or BMS       Antifungal tx    Turn q 2 hours  X  Up to chair      Ambulate    PT/OT      Dietician      PO  X   TF   TPN     PVN    NPO   # days    Other       WOUND TEAM PLAN OF CARE (X):    NPWT change 3 x week:         Dressing changes by wound team:       Follow up as needed: X       Other (explain):    Anticipated discharge plans (X):  SNF:           Home Care:           Outpatient Wound Center:            Self Care:            Other:   TBD

## 2017-01-04 NOTE — CARE PLAN
Problem: Communication  Goal: The ability to communicate needs accurately and effectively will improve  Outcome: PROGRESSING AS EXPECTED  Patient able to communicate needs effectively with staff.

## 2017-01-04 NOTE — PROGRESS NOTES
Patient MEWS score up to 4 due to respirations being elevated at 30 and . Dr. Daly made aware of patient appearing to be SOB. No new intervention other than possible CTA to r/o PE. MEWS score down to 3 when vitals rechecked. Patient does not appear to have acitve infection, will make dr. daly aware.     Dr. Daly paged about MEWS score 3.

## 2017-01-04 NOTE — RESPIRATORY CARE
COPD EDUCATION by COPD CLINICAL EDUCATOR  1/4/2017 at 6:55 AM by Camryn Boss     Patient reviewed by COPD education team. Patient does not qualify for COPD program.

## 2017-01-04 NOTE — THERAPY
"Physical Therapy Evaluation completed.   Bed Mobility:  Supine to Sit: Minimal Assist  Transfers: Sit to Stand: Minimal Assist  Gait: Level Of Assist: Minimal Assist with Front-Wheel Walker       Plan of Care: Will benefit from Physical Therapy 3 times per week  Discharge Recommendations: Equipment: Will Continue to Assess for Equipment Needs. Post-acute therapy recommended before discharged home.    See \"Rehab Therapy-Acute\" Patient Summary Report for complete documentation.     "

## 2017-01-04 NOTE — CARE PLAN
Problem: Knowledge Deficit  Goal: Knowledge of disease process/condition, treatment plan, diagnostic tests, and medications will improve  Intervention: Assess knowledge level of disease process/condition, treatment plan, diagnostic tests, and medications  Assessed pt for knowledge of tx plan

## 2017-01-05 PROCEDURE — 700111 HCHG RX REV CODE 636 W/ 250 OVERRIDE (IP): Performed by: HOSPITALIST

## 2017-01-05 PROCEDURE — 700102 HCHG RX REV CODE 250 W/ 637 OVERRIDE(OP): Performed by: HOSPITALIST

## 2017-01-05 PROCEDURE — 700111 HCHG RX REV CODE 636 W/ 250 OVERRIDE (IP): Performed by: INTERNAL MEDICINE

## 2017-01-05 PROCEDURE — 700102 HCHG RX REV CODE 250 W/ 637 OVERRIDE(OP): Performed by: INTERNAL MEDICINE

## 2017-01-05 PROCEDURE — 770006 HCHG ROOM/CARE - MED/SURG/GYN SEMI*

## 2017-01-05 PROCEDURE — 99232 SBSQ HOSP IP/OBS MODERATE 35: CPT | Performed by: INTERNAL MEDICINE

## 2017-01-05 PROCEDURE — A9270 NON-COVERED ITEM OR SERVICE: HCPCS | Performed by: INTERNAL MEDICINE

## 2017-01-05 PROCEDURE — A9270 NON-COVERED ITEM OR SERVICE: HCPCS | Performed by: HOSPITALIST

## 2017-01-05 RX ADMIN — TOLTERODINE TARTRATE 2 MG: 2 TABLET, FILM COATED ORAL at 20:30

## 2017-01-05 RX ADMIN — TOLTERODINE TARTRATE 2 MG: 2 TABLET, FILM COATED ORAL at 09:12

## 2017-01-05 RX ADMIN — Medication 1 TABLET: at 20:31

## 2017-01-05 RX ADMIN — FOLIC ACID 1 MG: 1 TABLET ORAL at 09:11

## 2017-01-05 RX ADMIN — PREDNISONE 8 MG: 1 TABLET ORAL at 11:23

## 2017-01-05 RX ADMIN — ENOXAPARIN SODIUM 30 MG: 100 INJECTION SUBCUTANEOUS at 09:11

## 2017-01-05 RX ADMIN — DIBASIC SODIUM PHOSPHATE, MONOBASIC POTASSIUM PHOSPHATE AND MONOBASIC SODIUM PHOSPHATE 2 TABLET: 852; 155; 130 TABLET ORAL at 20:30

## 2017-01-05 RX ADMIN — DIBASIC SODIUM PHOSPHATE, MONOBASIC POTASSIUM PHOSPHATE AND MONOBASIC SODIUM PHOSPHATE 2 TABLET: 852; 155; 130 TABLET ORAL at 09:11

## 2017-01-05 ASSESSMENT — ENCOUNTER SYMPTOMS
SORE THROAT: 0
MYALGIAS: 0
COUGH: 0
FEVER: 0
PALPITATIONS: 0
WEAKNESS: 0
VOMITING: 0
FOCAL WEAKNESS: 0
DIARRHEA: 0
NAUSEA: 0
DEPRESSION: 0
HALLUCINATIONS: 0
HEADACHES: 0
ABDOMINAL PAIN: 0
HEARTBURN: 0
SHORTNESS OF BREATH: 0
CHILLS: 0
BLOOD IN STOOL: 0
BACK PAIN: 0
DIZZINESS: 0

## 2017-01-05 ASSESSMENT — PAIN SCALES - GENERAL: PAINLEVEL_OUTOF10: 0

## 2017-01-05 NOTE — THERAPY
"Occupational Therapy Evaluation completed.   Functional Status:  Pt very anxious during tx session.  Pt difficult to ask PLOF questions as this seemed to make her more anxious & SOB.  Pt required Min A for supine to sit EOB.  Pt stood for 5 min with FWW & CGA to change her brief.  Pt required CGA to change hospital gown.  Pt put on her shoes with Mod A.  Pt transferred to bedside chair for lunch with Min aA  Plan of Care: Will benefit from Occupational Therapy 3 times per week  Discharge Recommendations:  Equipment: Will Continue to Assess for Equipment Needs. Post-acute therapy recommended before discharged home.    See \"Rehab Therapy-Acute\" Patient Summary Report for complete documentation.    "

## 2017-01-05 NOTE — DISCHARGE PLANNING
Medical Social Work    SW received call from Jenny from [facility] who reported that they have rolled the pt over to inpatient.

## 2017-01-05 NOTE — DISCHARGE PLANNING
Medical Social Work    SW received VM from Jenny padgett, who reported that she still doesn't meet criteria for inpatient. She might be able to roll the pt over to inpatient if the pt's oxygen stats drop and they have to increase her oxygen. Jenny can be reached at [phone].

## 2017-01-05 NOTE — CARE PLAN
Problem: Safety  Goal: Will remain free from injury  Intervention: Provide assistance with mobility  Hourly rounding, safety education, fall precautions including bed alarm, pt calls for assistance      Problem: Skin Integrity  Goal: Risk for impaired skin integrity will decrease  Intervention: Assess risk factors for impaired skin integrity and/or pressure ulcers  Every two hour turns with pillows and comfort care, moisturizer and barrier creams as needed

## 2017-01-05 NOTE — PROGRESS NOTES
Hospital Medicine Progress Note, Adult, Complex               Author: Carmen Daly Date & Time created: 1/4/2017  6:40 PM     CC: SOB    Interval History:  89F hx End stage 4L O2 and steroid dependent COPD and ILD and RA admitted after a fall with a R leg skin tear, found to have bandemia.  CXR is clear, she is at her baseline O2 requirement.  1/4: Still increased WOB, sats high 80s on baseline 4L, high DDimer, CTA ordered.  Pred incr 7-->8    Review of Systems:  Review of Systems   Constitutional: Negative for fever, chills and malaise/fatigue.   HENT: Negative for sore throat.    Respiratory: Negative for cough and shortness of breath.    Cardiovascular: Negative for chest pain and palpitations.   Gastrointestinal: Negative for heartburn, nausea, vomiting, abdominal pain, diarrhea and blood in stool.   Genitourinary: Negative for dysuria and frequency.   Musculoskeletal: Negative for myalgias and back pain.   Neurological: Negative for dizziness, focal weakness, weakness and headaches.   Psychiatric/Behavioral: Negative for depression and hallucinations.   All other systems reviewed and are negative.      Physical Exam:  Physical Exam   Constitutional: She is oriented to person, place, and time. She appears well-developed. No distress.   Thin, elderly   HENT:   Head: Normocephalic and atraumatic.   Eyes: Conjunctivae and EOM are normal.   Neck: Normal range of motion. No tracheal deviation present.   Cardiovascular: Normal rate and regular rhythm.    Pulmonary/Chest: She is in respiratory distress. She has no wheezes. She has no rales. She exhibits no tenderness.   Tachypnic, shallow inspirations   Abdominal: Soft. She exhibits no distension. There is no tenderness.   Musculoskeletal: She exhibits no edema or tenderness.   Neurological: She is alert and oriented to person, place, and time. No cranial nerve deficit.   Skin: No rash noted. No erythema.   Skin tear, R leg.   Psychiatric: She has a normal mood and  affect. Her behavior is normal.       Labs:        Invalid input(s): [id number]      Recent Labs      17   0652  17   0238   SODIUM  140  135   POTASSIUM  3.9  4.3   CHLORIDE  101  99   CO2  32  33   BUN  25*  22   CREATININE  0.66  0.70   PHOSPHORUS   --   2.3*   CALCIUM  8.8  8.6     Recent Labs      17   0652  17   0238   GLUCOSE  66  78     Recent Labs      17   0652  17   0238   RBC  3.93*  3.89*   HEMOGLOBIN  12.5  12.6   HEMATOCRIT  39.2  38.3   PLATELETCT  221  209     Recent Labs      17   0652  17   0238   WBC  6.4  5.3   NEUTSPOLYS  74.40*  71.30   LYMPHOCYTES  11.70*  15.80*   MONOCYTES  7.80  6.80   EOSINOPHILS  4.80  5.30   BASOPHILS  0.80  0.40           Hemodynamics:  Temp (24hrs), Av.1 °C (98.8 °F), Min:36.7 °C (98 °F), Max:37.9 °C (100.2 °F)  Temperature: 36.9 °C (98.4 °F)  Pulse  Av.5  Min: 62  Max: 110   Blood Pressure : 103/64 mmHg     Respiratory:    Respiration: (!) 28 (RN notified), Pulse Oximetry: 96 %        RUL Breath Sounds: Rhonchi, RML Breath Sounds: Diminished, RLL Breath Sounds: Diminished, IRLANDA Breath Sounds: Rhonchi, LLL Breath Sounds: Diminished  Fluids:  No intake or output data in the 24 hours ending 17 1840     GI/Nutrition:  Orders Placed This Encounter   Procedures   • Diet Order     Standing Status: Standing      Number of Occurrences: 1      Standing Expiration Date:      Order Specific Question:  Diet:     Answer:  Regular [1]     Medical Decision Making, by Problem:     Acute on chronic respiratory failure with hypoxia (HCC) -weaned to her baseline, CT negative for PE  -Recent fall could suggest rib fracture, none seen on CXR  -Increase Prednisone 7-->8mg daily  -High DDimer, can't do VQ, CTA ordered     ILD (interstitial lung disease) ([facility])   -Prednisone 8mg daily     Skin tear of right lower leg without complication  -Wound care     End stage steroid and 4L O2 dependent COPD (chronic obstructive pulmonary  disease) (HCC)   -O2 at 4L     H/O rheumatoid arthritis   -On Methotrexate at home  -Resume on AM    Hypophosphatemia   -Replace PO  -Repeat in AM    Dispo: Mobilize, home w  when stable, f/u CTA      Labs reviewed and Medications reviewed  Conrad catheter: No Conrad      DVT Prophylaxis: Enoxaparin (Lovenox)  DVT prophylaxis - mechanical: SCDs  Ulcer prophylaxis: Not indicated    Assessed for rehab: Patient was assess for and/or received rehabilitation services during this hospitalization

## 2017-01-06 LAB
ALBUMIN SERPL BCP-MCNC: 2.5 G/DL (ref 3.2–4.9)
BUN SERPL-MCNC: 10 MG/DL (ref 8–22)
CALCIUM SERPL-MCNC: 8.7 MG/DL (ref 8.5–10.5)
CHLORIDE SERPL-SCNC: 98 MMOL/L (ref 96–112)
CO2 SERPL-SCNC: 35 MMOL/L (ref 20–33)
CREAT SERPL-MCNC: 0.46 MG/DL (ref 0.5–1.4)
GFR SERPL CREATININE-BSD FRML MDRD: >60 ML/MIN/1.73 M 2
GLUCOSE SERPL-MCNC: 92 MG/DL (ref 65–99)
PHOSPHATE SERPL-MCNC: 2.8 MG/DL (ref 2.5–4.5)
POTASSIUM SERPL-SCNC: 4.1 MMOL/L (ref 3.6–5.5)
SODIUM SERPL-SCNC: 136 MMOL/L (ref 135–145)

## 2017-01-06 PROCEDURE — A9270 NON-COVERED ITEM OR SERVICE: HCPCS | Performed by: HOSPITALIST

## 2017-01-06 PROCEDURE — 700111 HCHG RX REV CODE 636 W/ 250 OVERRIDE (IP): Performed by: HOSPITALIST

## 2017-01-06 PROCEDURE — A9270 NON-COVERED ITEM OR SERVICE: HCPCS | Performed by: INTERNAL MEDICINE

## 2017-01-06 PROCEDURE — 99232 SBSQ HOSP IP/OBS MODERATE 35: CPT | Performed by: INTERNAL MEDICINE

## 2017-01-06 PROCEDURE — 80069 RENAL FUNCTION PANEL: CPT

## 2017-01-06 PROCEDURE — 700111 HCHG RX REV CODE 636 W/ 250 OVERRIDE (IP): Performed by: INTERNAL MEDICINE

## 2017-01-06 PROCEDURE — 700112 HCHG RX REV CODE 229: Performed by: HOSPITALIST

## 2017-01-06 PROCEDURE — 700102 HCHG RX REV CODE 250 W/ 637 OVERRIDE(OP): Performed by: HOSPITALIST

## 2017-01-06 PROCEDURE — 36415 COLL VENOUS BLD VENIPUNCTURE: CPT

## 2017-01-06 PROCEDURE — 770006 HCHG ROOM/CARE - MED/SURG/GYN SEMI*

## 2017-01-06 PROCEDURE — 700102 HCHG RX REV CODE 250 W/ 637 OVERRIDE(OP): Performed by: INTERNAL MEDICINE

## 2017-01-06 RX ADMIN — TOLTERODINE TARTRATE 2 MG: 2 TABLET, FILM COATED ORAL at 21:20

## 2017-01-06 RX ADMIN — DOCUSATE SODIUM 100 MG: 100 CAPSULE ORAL at 08:19

## 2017-01-06 RX ADMIN — TOLTERODINE TARTRATE 2 MG: 2 TABLET, FILM COATED ORAL at 08:19

## 2017-01-06 RX ADMIN — DIBASIC SODIUM PHOSPHATE, MONOBASIC POTASSIUM PHOSPHATE AND MONOBASIC SODIUM PHOSPHATE 2 TABLET: 852; 155; 130 TABLET ORAL at 08:19

## 2017-01-06 RX ADMIN — DIBASIC SODIUM PHOSPHATE, MONOBASIC POTASSIUM PHOSPHATE AND MONOBASIC SODIUM PHOSPHATE 2 TABLET: 852; 155; 130 TABLET ORAL at 21:20

## 2017-01-06 RX ADMIN — PREDNISONE 8 MG: 1 TABLET ORAL at 11:55

## 2017-01-06 RX ADMIN — FOLIC ACID 1 MG: 1 TABLET ORAL at 08:19

## 2017-01-06 RX ADMIN — ENOXAPARIN SODIUM 30 MG: 100 INJECTION SUBCUTANEOUS at 08:19

## 2017-01-06 RX ADMIN — Medication 1 TABLET: at 21:20

## 2017-01-06 ASSESSMENT — ENCOUNTER SYMPTOMS
DEPRESSION: 0
FOCAL WEAKNESS: 0
BLOOD IN STOOL: 0
NAUSEA: 0
HALLUCINATIONS: 0
MYALGIAS: 0
SHORTNESS OF BREATH: 0
WEAKNESS: 0
DIARRHEA: 0
CHILLS: 0
HEADACHES: 0
BACK PAIN: 0
COUGH: 0
SORE THROAT: 0
PALPITATIONS: 0
FEVER: 0
VOMITING: 0
HEARTBURN: 0
ABDOMINAL PAIN: 0
DIZZINESS: 0

## 2017-01-06 ASSESSMENT — PAIN SCALES - GENERAL
PAINLEVEL_OUTOF10: 0

## 2017-01-06 NOTE — CARE PLAN
Problem: Safety  Goal: Will remain free from falls  Intervention: Assess risk factors for falls  Will explain to pt to turn frequently

## 2017-01-06 NOTE — DISCHARGE PLANNING
J LUIS met with patient at bedside to discuss her transitional care options. Patient reports she plans to DC home with intermittent assistance with her daughter and son in law. Patient reports her daughter is still in the hospital and is unsure when she will be going home. Patient reports her grandson would assist her if needed but was unable to provide contact information for any additional family members. Patient believes she is discharging this weekend. Currently, patient requires assistance with all mobility and therapy is recommending SNF. Patient is unwilling to make choice for SNF or HH And is unsure if or whom her primary care physician is for a HH referral. Plan to follow up with family for assist with DC planning once return phone call received.

## 2017-01-06 NOTE — CARE PLAN
Problem: Respiratory:  Goal: Respiratory status will improve  Intervention: Educate and encourage coughing and deep breathing  Talked about tcdb techniques

## 2017-01-06 NOTE — DISCHARGE PLANNING
Medical Social Work    TCN continues to reach out to family regarding d/c options. Per PT/OT, pt is likely to require SNF vs. HH at [place]. However, pt requires 2 more midnight for qualifying stay. SW completed pt PASRR #[id number].

## 2017-01-06 NOTE — PROGRESS NOTES
Hospital Medicine Progress Note, Adult, Complex               Author: Carmen Daly Date & Time created: 1/5/2017  5:41 PM     CC: SOB    Interval History:  89F hx End stage 4L O2 and steroid dependent COPD and ILD and RA admitted after a fall with a R leg skin tear, found to have bandemia.  CXR is clear, she is at her baseline O2 requirement.  1/4: Still increased WOB, sats high 80s on baseline 4L, high DDimer, CTA ordered.  Pred incr 7-->8  1/5: Pending SNF placement, CTA negative for PE    Review of Systems:  Review of Systems   Constitutional: Negative for fever, chills and malaise/fatigue.   HENT: Negative for sore throat.    Respiratory: Negative for cough and shortness of breath.    Cardiovascular: Negative for chest pain and palpitations.   Gastrointestinal: Negative for heartburn, nausea, vomiting, abdominal pain, diarrhea and blood in stool.   Genitourinary: Negative for dysuria and frequency.   Musculoskeletal: Negative for myalgias and back pain.   Neurological: Negative for dizziness, focal weakness, weakness and headaches.   Psychiatric/Behavioral: Negative for depression and hallucinations.   All other systems reviewed and are negative.      Physical Exam:  Physical Exam   Constitutional: She is oriented to person, place, and time. She appears well-developed. No distress.   Thin, elderly   HENT:   Head: Normocephalic and atraumatic.   Eyes: Conjunctivae and EOM are normal.   Neck: Normal range of motion. No tracheal deviation present.   Cardiovascular: Normal rate and regular rhythm.    Pulmonary/Chest: She has no wheezes. She has no rales. She exhibits no tenderness.   Tachypnic, shallow inspirations   Abdominal: Soft. She exhibits no distension. There is no tenderness.   Musculoskeletal: She exhibits no edema or tenderness.   Neurological: She is alert and oriented to person, place, and time. No cranial nerve deficit.   Skin: No rash noted. No erythema.   Skin tear, R leg.   Psychiatric: She has  a normal mood and affect. Her behavior is normal.       Labs:        Invalid input(s): [id number]      Recent Labs      17   0652  17   0238   SODIUM  140  135   POTASSIUM  3.9  4.3   CHLORIDE  101  99   CO2  32  33   BUN  25*  22   CREATININE  0.66  0.70   PHOSPHORUS   --   2.3*   CALCIUM  8.8  8.6     Recent Labs      17   0652  17   0238   GLUCOSE  66  78     Recent Labs      17   0652  17   0238   RBC  3.93*  3.89*   HEMOGLOBIN  12.5  12.6   HEMATOCRIT  39.2  38.3   PLATELETCT  221  209     Recent Labs      17   0652  17   0238   WBC  6.4  5.3   NEUTSPOLYS  74.40*  71.30   LYMPHOCYTES  11.70*  15.80*   MONOCYTES  7.80  6.80   EOSINOPHILS  4.80  5.30   BASOPHILS  0.80  0.40           Hemodynamics:  Temp (24hrs), Av.2 °C (98.9 °F), Min:36.8 °C (98.2 °F), Max:37.6 °C (99.7 °F)  Temperature: 36.8 °C (98.2 °F)  Pulse  Av.2  Min: 62  Max: 111   Blood Pressure : 103/65 mmHg     Respiratory:    Respiration: (!) 28 (RN notified), Pulse Oximetry: 96 %, O2 Daily Delivery Respiratory : Silicone Nasal Cannula     Given By:: Mouthpiece, Work Of Breathing / Effort: Shallow;Mild  RUL Breath Sounds: Diminished, RML Breath Sounds: Diminished, RLL Breath Sounds: Diminished, IRLANDA Breath Sounds: Diminished, LLL Breath Sounds: Diminished  Fluids:  No intake or output data in the 24 hours ending 17 1741     GI/Nutrition:  Orders Placed This Encounter   Procedures   • Diet Order     Standing Status: Standing      Number of Occurrences: 1      Standing Expiration Date:      Order Specific Question:  Diet:     Answer:  Regular [1]     Medical Decision Making, by Problem:     Acute on chronic respiratory failure with hypoxia (HCC) -weaned to her baseline, CT negative for PE  -Recent fall could suggest rib fracture, none seen on CXR  -Prednisone 8mg daily  -CTA negative for PE     ILD (interstitial lung disease) (HCC)   -Prednisone 8mg daily     Skin tear of right lower leg  without complication  -Wound care     End stage steroid and 4L O2 dependent COPD (chronic obstructive pulmonary disease) (HCC)   -O2 at 4L     H/O rheumatoid arthritis   -On Methotrexate at home  -Resume on AM    Hypophosphatemia   -Replace PO  -Repeat in AM    Dispo: Mobilize, DC to SNF when accepted      Labs reviewed and Medications reviewed  Conrad catheter: No Conrad      DVT Prophylaxis: Enoxaparin (Lovenox)  DVT prophylaxis - mechanical: SCDs  Ulcer prophylaxis: Not indicated    Assessed for rehab: Patient was assess for and/or received rehabilitation services during this hospitalization

## 2017-01-06 NOTE — DISCHARGE PLANNING
Patient is now IP status and would qualify for SNF. TCN to attempt to reach patient daughter later this date for DC plan preferences. Plan to discuss DC recommendations during IDT rounds.

## 2017-01-07 PROCEDURE — 700111 HCHG RX REV CODE 636 W/ 250 OVERRIDE (IP): Performed by: HOSPITALIST

## 2017-01-07 PROCEDURE — 99232 SBSQ HOSP IP/OBS MODERATE 35: CPT | Performed by: INTERNAL MEDICINE

## 2017-01-07 PROCEDURE — 770006 HCHG ROOM/CARE - MED/SURG/GYN SEMI*

## 2017-01-07 PROCEDURE — 700111 HCHG RX REV CODE 636 W/ 250 OVERRIDE (IP): Performed by: INTERNAL MEDICINE

## 2017-01-07 PROCEDURE — A9270 NON-COVERED ITEM OR SERVICE: HCPCS | Performed by: INTERNAL MEDICINE

## 2017-01-07 PROCEDURE — 700102 HCHG RX REV CODE 250 W/ 637 OVERRIDE(OP): Performed by: HOSPITALIST

## 2017-01-07 PROCEDURE — A9270 NON-COVERED ITEM OR SERVICE: HCPCS | Performed by: HOSPITALIST

## 2017-01-07 PROCEDURE — 700102 HCHG RX REV CODE 250 W/ 637 OVERRIDE(OP): Performed by: INTERNAL MEDICINE

## 2017-01-07 PROCEDURE — 700112 HCHG RX REV CODE 229: Performed by: HOSPITALIST

## 2017-01-07 RX ADMIN — DIBASIC SODIUM PHOSPHATE, MONOBASIC POTASSIUM PHOSPHATE AND MONOBASIC SODIUM PHOSPHATE 2 TABLET: 852; 155; 130 TABLET ORAL at 21:47

## 2017-01-07 RX ADMIN — Medication 1 TABLET: at 21:47

## 2017-01-07 RX ADMIN — DOCUSATE SODIUM 100 MG: 100 CAPSULE ORAL at 08:20

## 2017-01-07 RX ADMIN — ENOXAPARIN SODIUM 30 MG: 100 INJECTION SUBCUTANEOUS at 08:18

## 2017-01-07 RX ADMIN — PREDNISONE 8 MG: 1 TABLET ORAL at 09:00

## 2017-01-07 RX ADMIN — TOLTERODINE TARTRATE 2 MG: 2 TABLET, FILM COATED ORAL at 21:47

## 2017-01-07 RX ADMIN — FOLIC ACID 1 MG: 1 TABLET ORAL at 08:18

## 2017-01-07 RX ADMIN — TOLTERODINE TARTRATE 2 MG: 2 TABLET, FILM COATED ORAL at 08:18

## 2017-01-07 RX ADMIN — DIBASIC SODIUM PHOSPHATE, MONOBASIC POTASSIUM PHOSPHATE AND MONOBASIC SODIUM PHOSPHATE 2 TABLET: 852; 155; 130 TABLET ORAL at 08:18

## 2017-01-07 ASSESSMENT — ENCOUNTER SYMPTOMS
FEVER: 0
PALPITATIONS: 0
BACK PAIN: 0
COUGH: 0
FOCAL WEAKNESS: 0
DIZZINESS: 0
NAUSEA: 0
MYALGIAS: 0
ABDOMINAL PAIN: 0
VOMITING: 0
HALLUCINATIONS: 0
DEPRESSION: 0
SHORTNESS OF BREATH: 0
HEARTBURN: 0
HEADACHES: 0
WEAKNESS: 0
CHILLS: 0
DIARRHEA: 0
SORE THROAT: 0
BLOOD IN STOOL: 0

## 2017-01-07 ASSESSMENT — PAIN SCALES - GENERAL
PAINLEVEL_OUTOF10: 0
PAINLEVEL_OUTOF10: 0

## 2017-01-07 NOTE — PROGRESS NOTES
Report received. Assumed care. Pt in bed awake. Alert to self only. VSS. Denies pain, SOB. Assessment complete. Discussed POC, q2 turns, pain control, mobility, wound consult, DC planning , pt verbalizes understanding. Explained importance of calling before getting OOB. Call light and belongings within reach. Bed alarm on. Bed in the lowest position. Treaded socks in place. Hourly rounding in progress. Will continue to monitor .

## 2017-01-07 NOTE — CARE PLAN
Problem: Safety  Goal: Will remain free from injury  Treaded socks in place, bed in the lowest position, bed alarm on, call light and belongings within reach, pt call for assistance appropriately    Problem: Venous Thromboembolism (VTW)/Deep Vein Thrombosis (DVT) Prevention:  Goal: Patient will participate in Venous Thrombosis (VTE)/Deep Vein Thrombosis (DVT)Prevention Measures  scds on, receiving lovenox    Problem: Skin Integrity  Goal: Risk for impaired skin integrity will decrease  q2 turns, jaren risk assessment, applied barrier cream, wound consult place, mepilex applied to the back

## 2017-01-07 NOTE — PROGRESS NOTES
Hospital Medicine Progress Note, Adult, Complex               Author: Carmenalfonso Daly Date & Time created: 1/6/2017  6:43 PM     CC: SOB    Interval History:  89F hx End stage 4L O2 and steroid dependent COPD and ILD and RA admitted after a fall with a R leg skin tear, found to have bandemia.  CXR is clear, she is at her baseline O2 requirement.  1/4: Still increased WOB, sats high 80s on baseline 4L, high DDimer, CTA ordered.  Pred incr 7-->8  1/5: Pending SNF placement, CTA negative for PE  1/6: Pending SNF placement, continue supplemental O2    Review of Systems:  Review of Systems   Constitutional: Negative for fever, chills and malaise/fatigue.   HENT: Negative for sore throat.    Respiratory: Negative for cough and shortness of breath.    Cardiovascular: Negative for chest pain and palpitations.   Gastrointestinal: Negative for heartburn, nausea, vomiting, abdominal pain, diarrhea and blood in stool.   Genitourinary: Negative for dysuria and frequency.   Musculoskeletal: Negative for myalgias and back pain.   Neurological: Negative for dizziness, focal weakness, weakness and headaches.   Psychiatric/Behavioral: Negative for depression and hallucinations.   All other systems reviewed and are negative.      Physical Exam:  Physical Exam   Constitutional: She is oriented to person, place, and time. She appears well-developed. No distress.   Thin, elderly   HENT:   Head: Normocephalic and atraumatic.   Eyes: Conjunctivae and EOM are normal.   Neck: Normal range of motion. No tracheal deviation present.   Cardiovascular: Normal rate and regular rhythm.    Pulmonary/Chest: She has no wheezes. She has no rales. She exhibits no tenderness.   Shallow inspirations, more comfortable today   Abdominal: Soft. She exhibits no distension. There is no tenderness.   Musculoskeletal: She exhibits no edema or tenderness.   Neurological: She is alert and oriented to person, place, and time. No cranial nerve deficit.   Skin: No  rash noted. No erythema.   Skin tear, R leg.   Psychiatric: She has a normal mood and affect. Her behavior is normal.       Labs:        Invalid input(s): [id number]      Recent Labs      17   0238  17   0331   SODIUM  135  136   POTASSIUM  4.3  4.1   CHLORIDE  99  98   CO2  33  35*   BUN  22  10   CREATININE  0.70  0.46*   PHOSPHORUS  2.3*  2.8   CALCIUM  8.6  8.7     Recent Labs      17   0238  17   0331   GLUCOSE  78  92     Recent Labs      17   RBC  3.89*   HEMOGLOBIN  12.6   HEMATOCRIT  38.3   PLATELETCT  209     Recent Labs      17   WBC  5.3   NEUTSPOLYS  71.30   LYMPHOCYTES  15.80*   MONOCYTES  6.80   EOSINOPHILS  5.30   BASOPHILS  0.40           Hemodynamics:  Temp (24hrs), Av.7 °C (98.1 °F), Min:36.2 °C (97.1 °F), Max:36.9 °C (98.5 °F)  Temperature: 36.9 °C (98.5 °F)  Pulse  Av.1  Min: 53  Max: 111   Blood Pressure : 106/75 mmHg     Respiratory:    Respiration: 16, Pulse Oximetry: 98 %     Work Of Breathing / Effort: Shallow;Mild  RUL Breath Sounds: Diminished, RML Breath Sounds: Diminished, RLL Breath Sounds: Diminished, IRLANDA Breath Sounds: Diminished, LLL Breath Sounds: Diminished  Fluids:  No intake or output data in the 24 hours ending 17 1843     GI/Nutrition:  Orders Placed This Encounter   Procedures   • Diet Order     Standing Status: Standing      Number of Occurrences: 1      Standing Expiration Date:      Order Specific Question:  Diet:     Answer:  Regular [1]     Medical Decision Making, by Problem:     Acute on chronic respiratory failure with hypoxia ([facility]) -weaned to her baseline, CT negative for PE  -Recent fall could suggest rib fracture, none seen on CXR  -Prednisone 8mg daily  -CTA negative for PE     ILD (interstitial lung disease) (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)    -O2 at 4L     H/O rheumatoid arthritis   -On Methotrexate at home  -Resume on AM    Hypophosphatemia   -Replace PO  -Repeat in AM    Dispo: Mobilize, DC to SNF in 48h      Labs reviewed and Medications reviewed  Conrad catheter: No Conrad      DVT Prophylaxis: Enoxaparin (Lovenox)  DVT prophylaxis - mechanical: SCDs  Ulcer prophylaxis: Not indicated    Assessed for rehab: Patient was assess for and/or received rehabilitation services during this hospitalization

## 2017-01-07 NOTE — CARE PLAN
Problem: Safety  Goal: Will remain free from injury  Outcome: PROGRESSING AS EXPECTED  Patient without any new injuries. Alarm in place should patient forget to call for assistance and try to get up on her own.

## 2017-01-08 PROCEDURE — A9270 NON-COVERED ITEM OR SERVICE: HCPCS | Performed by: INTERNAL MEDICINE

## 2017-01-08 PROCEDURE — 99233 SBSQ HOSP IP/OBS HIGH 50: CPT | Performed by: INTERNAL MEDICINE

## 2017-01-08 PROCEDURE — 770006 HCHG ROOM/CARE - MED/SURG/GYN SEMI*

## 2017-01-08 PROCEDURE — 700102 HCHG RX REV CODE 250 W/ 637 OVERRIDE(OP): Performed by: HOSPITALIST

## 2017-01-08 PROCEDURE — 700111 HCHG RX REV CODE 636 W/ 250 OVERRIDE (IP): Performed by: HOSPITALIST

## 2017-01-08 PROCEDURE — 700102 HCHG RX REV CODE 250 W/ 637 OVERRIDE(OP): Performed by: INTERNAL MEDICINE

## 2017-01-08 PROCEDURE — A9270 NON-COVERED ITEM OR SERVICE: HCPCS | Performed by: HOSPITALIST

## 2017-01-08 PROCEDURE — 700111 HCHG RX REV CODE 636 W/ 250 OVERRIDE (IP): Performed by: INTERNAL MEDICINE

## 2017-01-08 RX ADMIN — ENOXAPARIN SODIUM 30 MG: 100 INJECTION SUBCUTANEOUS at 08:34

## 2017-01-08 RX ADMIN — FOLIC ACID 1 MG: 1 TABLET ORAL at 08:34

## 2017-01-08 RX ADMIN — ACETAMINOPHEN 650 MG: 325 TABLET, FILM COATED ORAL at 14:06

## 2017-01-08 RX ADMIN — PREDNISONE 8 MG: 1 TABLET ORAL at 10:25

## 2017-01-08 RX ADMIN — TOLTERODINE TARTRATE 2 MG: 2 TABLET, FILM COATED ORAL at 21:30

## 2017-01-08 RX ADMIN — TOLTERODINE TARTRATE 2 MG: 2 TABLET, FILM COATED ORAL at 08:34

## 2017-01-08 RX ADMIN — DIBASIC SODIUM PHOSPHATE, MONOBASIC POTASSIUM PHOSPHATE AND MONOBASIC SODIUM PHOSPHATE 2 TABLET: 852; 155; 130 TABLET ORAL at 08:34

## 2017-01-08 ASSESSMENT — ENCOUNTER SYMPTOMS
FEVER: 0
HEADACHES: 0
SORE THROAT: 0
DIARRHEA: 0
TREMORS: 0
HALLUCINATIONS: 0
PALPITATIONS: 0
DEPRESSION: 1
MYALGIAS: 0
SHORTNESS OF BREATH: 1
BLOOD IN STOOL: 0
HEARTBURN: 0
WHEEZING: 0
ABDOMINAL PAIN: 0
WEAKNESS: 1
BACK PAIN: 0
SPUTUM PRODUCTION: 0
COUGH: 0
CHILLS: 0
FOCAL WEAKNESS: 0

## 2017-01-08 ASSESSMENT — COPD QUESTIONNAIRES
HAVE YOU SMOKED AT LEAST 100 CIGARETTES IN YOUR ENTIRE LIFE: YES
DO YOU EVER COUGH UP ANY MUCUS OR PHLEGM?: YES, A FEW DAYS A WEEK OR MONTH
COPD SCREENING SCORE: 8
DURING THE PAST 4 WEEKS HOW MUCH DID YOU FEEL SHORT OF BREATH: SOME OF THE TIME

## 2017-01-08 ASSESSMENT — PATIENT HEALTH QUESTIONNAIRE - PHQ9
SUM OF ALL RESPONSES TO PHQ9 QUESTIONS 1 AND 2: 0
SUM OF ALL RESPONSES TO PHQ QUESTIONS 1-9: 0
1. LITTLE INTEREST OR PLEASURE IN DOING THINGS: NOT AT ALL
2. FEELING DOWN, DEPRESSED, IRRITABLE, OR HOPELESS: NOT AT ALL

## 2017-01-08 ASSESSMENT — PAIN SCALES - GENERAL: PAINLEVEL_OUTOF10: 0

## 2017-01-08 ASSESSMENT — LIFESTYLE VARIABLES: DO YOU DRINK ALCOHOL: NO

## 2017-01-08 NOTE — CARE PLAN
"Problem: Safety  Goal: Will remain free from falls  Outcome: PROGRESSING SLOWER THAN EXPECTED  Patient refusing to get out of bed r/t SOB and fear of \"being left in chair for longer than 30 minutes\". Despite frequent and thorough education patient still refusing to get out of the bed.           "

## 2017-01-08 NOTE — PROGRESS NOTES
Report received. Assumed care. Pt in bed awake. Disoriented to time and place. VSS.  Denies pain, SOB. Assessment complete. mepilex to the back in place, sacrum red but blanching. Discussed POC, q2 turns, mobility, safety, DC planning, pt verbalizes understanding. Explained importance of calling before getting OOB. Call light and belongings within reach. Bed alarm on. Bed in the lowest position. Treaded socks in place. Hourly rounding in progress. Will continue to monitor .

## 2017-01-08 NOTE — PROGRESS NOTES
Hospital Medicine Progress Note, Adult, Complex               Author: Carmen Daly Date & Time created: 1/7/2017  6:18 PM     CC: SOB    Interval History:  89F hx End stage 4L O2 and steroid dependent COPD and ILD and RA admitted after a fall with a R leg skin tear, found to have bandemia.  CXR is clear, she is at her baseline O2 requirement.  1/4: Still increased WOB, sats high 80s on baseline 4L, high DDimer, CTA ordered.  Pred incr 7-->8  1/5: Pending SNF placement, CTA negative for PE  1/7: Pending SNF placement, continue supplemental O2    Review of Systems:  Review of Systems   Constitutional: Negative for fever, chills and malaise/fatigue.   HENT: Negative for sore throat.    Respiratory: Negative for cough and shortness of breath.    Cardiovascular: Negative for chest pain and palpitations.   Gastrointestinal: Negative for heartburn, nausea, vomiting, abdominal pain, diarrhea and blood in stool.   Genitourinary: Negative for dysuria and frequency.   Musculoskeletal: Negative for myalgias and back pain.   Neurological: Negative for dizziness, focal weakness, weakness and headaches.   Psychiatric/Behavioral: Negative for depression and hallucinations.   All other systems reviewed and are negative.      Physical Exam:  Physical Exam   Constitutional: She is oriented to person, place, and time. She appears well-developed. No distress.   Thin, elderly   HENT:   Head: Normocephalic and atraumatic.   Eyes: Conjunctivae and EOM are normal.   Neck: Normal range of motion. No tracheal deviation present.   Cardiovascular: Normal rate and regular rhythm.    Pulmonary/Chest: She has no wheezes. She has no rales. She exhibits no tenderness.   Shallow inspirations, more comfortable today   Abdominal: Soft. She exhibits no distension. There is no tenderness.   Musculoskeletal: She exhibits no edema or tenderness.   Neurological: She is alert and oriented to person, place, and time. No cranial nerve deficit.   Skin: No  rash noted. No erythema.   Psychiatric: She has a normal mood and affect. Her behavior is normal.       Labs:        Invalid input(s): [id number]      Recent Labs      17   0331   SODIUM  136   POTASSIUM  4.1   CHLORIDE  98   CO2  35*   BUN  10   CREATININE  0.46*   PHOSPHORUS  2.8   CALCIUM  8.7     Recent Labs      17   0331   GLUCOSE  92     No results for input(s): RBC, HEMOGLOBIN, HEMATOCRIT, PLATELETCT, PROTHROMBTM, APTT, INR, IRON, FERRITIN, TOTIRONBC in the last 72 hours.            Hemodynamics:  Temp (24hrs), Av.5 °C (97.7 °F), Min:36 °C (96.8 °F), Max:36.9 °C (98.4 °F)  Temperature: 36.8 °C (98.3 °F)  Pulse  Av  Min: 53  Max: 111   Blood Pressure : 100/66 mmHg     Respiratory:    Respiration: 17, Pulse Oximetry: 96 %     Work Of Breathing / Effort: Accessory Muscle Use;Increased Work of Breathing;Nasal Flaring;Speech Limiting/Unable to complete full sentence  RUL Breath Sounds: Diminished, RML Breath Sounds: Diminished, RLL Breath Sounds: Diminished, IRLANDA Breath Sounds: Diminished, LLL Breath Sounds: Diminished  Fluids:    Intake/Output Summary (Last 24 hours) at 17 1818  Last data filed at 17 0715   Gross per 24 hour   Intake    500 ml   Output      0 ml   Net    500 ml        GI/Nutrition:  Orders Placed This Encounter   Procedures   • Diet Order     Standing Status: Standing      Number of Occurrences: 1      Standing Expiration Date:      Order Specific Question:  Diet:     Answer:  Regular [1]     Medical Decision Making, by Problem:     Acute on chronic respiratory failure with hypoxia ([facility]) -weaned to her baseline, CT negative for PE  -Recent fall could suggest rib fracture, none seen on CXR  -Prednisone 8mg daily  -CTA negative for PE     ILD (interstitial lung disease) ([facility])   -Prednisone 8mg daily     Skin tear of right lower leg without complication  -Wound care     End stage steroid and 4L O2 dependent COPD (chronic obstructive pulmonary disease) ([facility])   -O2  at 4L     H/O rheumatoid arthritis   -On Methotrexate at home  -Resume on AM    Hypophosphatemia   -Replace PO  -Repeat in AM    Dispo: Mobilize, DC to SNF in AM if stable      Labs reviewed and Medications reviewed  Conrad catheter: No Conrad      DVT Prophylaxis: Enoxaparin (Lovenox)  DVT prophylaxis - mechanical: SCDs  Ulcer prophylaxis: Not indicated    Assessed for rehab: Patient was assess for and/or received rehabilitation services during this hospitalization

## 2017-01-08 NOTE — CARE PLAN
Problem: Safety  Goal: Will remain free from injury  Treaded socks in place, bed in the lowest position, bed alarm on, call light and belongings within reach, pt call for assistance appropriately    Problem: Venous Thromboembolism (VTW)/Deep Vein Thrombosis (DVT) Prevention:  Goal: Patient will participate in Venous Thrombosis (VTE)/Deep Vein Thrombosis (DVT)Prevention Measures  scds on, receiving lovenox    Problem: Skin Integrity  Goal: Risk for impaired skin integrity will decrease  q2 bucky, jaren risk assessment, applied barrier cream

## 2017-01-08 NOTE — PROGRESS NOTES
Hospital Medicine Progress Note, Adult, Complex               Author: Sherry Contreras Date & Time created: 1/8/2017  12:25 PM     CC: SOB    Interval History:  89F hx End stage 4L O2 and steroid dependent COPD and ILD and RA admitted after a fall with a R leg skin tear, found to have bandemia.  CXR is clear, she is at her baseline O2 requirement.  1/4: Still increased WOB, sats high 80s on baseline 4L, high DDimer, CTA ordered.  Pred incr 7-->8  1/5: Pending SNF placement, CTA negative for PE  1/7: Pending SNF placement, continue supplemental O2  1/8:  Pending snf, no new events, encourage IS use, 500 to 800 ml    Review of Systems:  Review of Systems   Constitutional: Negative for fever, chills and malaise/fatigue.   HENT: Positive for hearing loss. Negative for sore throat.    Respiratory: Positive for shortness of breath. Negative for cough, sputum production and wheezing.    Cardiovascular: Negative for palpitations and leg swelling.   Gastrointestinal: Negative for heartburn, abdominal pain, diarrhea and blood in stool.   Genitourinary: Negative for dysuria and frequency.   Musculoskeletal: Negative for myalgias and back pain.   Neurological: Positive for weakness. Negative for tremors, focal weakness and headaches.   Psychiatric/Behavioral: Positive for depression. Negative for hallucinations.   All other systems reviewed and are negative.      Physical Exam:  Physical Exam   Constitutional: She is oriented to person, place, and time. No distress.   Thin, elderly   HENT:   Head: Normocephalic and atraumatic.   Mouth/Throat: No oropharyngeal exudate.   Eyes: Conjunctivae and EOM are normal. Pupils are equal, round, and reactive to light.   Neck: Normal range of motion. No tracheal deviation present.   Cardiovascular: Normal rate and regular rhythm.    Pulmonary/Chest: No respiratory distress. She has no wheezes. She exhibits no tenderness.   Shallow inspirations   Abdominal: Soft. Bowel sounds are normal. She  exhibits no distension. There is no tenderness.   Musculoskeletal: She exhibits no edema or tenderness.   Neurological: She is alert and oriented to person, place, and time. No cranial nerve deficit. She exhibits normal muscle tone. Coordination normal.   Skin: Skin is warm and dry. She is not diaphoretic.   ecchymosis   Psychiatric: She has a normal mood and affect. Her behavior is normal.   Nursing note and vitals reviewed.      Labs:        Invalid input(s): [id number]      Recent Labs      17   0331   SODIUM  136   POTASSIUM  4.1   CHLORIDE  98   CO2  35*   BUN  10   CREATININE  0.46*   PHOSPHORUS  2.8   CALCIUM  8.7     Recent Labs      17   0331   GLUCOSE  92     No results for input(s): RBC, HEMOGLOBIN, HEMATOCRIT, PLATELETCT, PROTHROMBTM, APTT, INR, IRON, FERRITIN, TOTIRONBC in the last 72 hours.            Hemodynamics:  Temp (24hrs), Av.9 °C (98.5 °F), Min:36.2 °C (97.2 °F), Max:37.9 °C (100.2 °F)  Temperature: 37.9 °C (100.2 °F)  Pulse  Av.2  Min: 53  Max: 111   Blood Pressure : 111/70 mmHg     Respiratory:    Respiration: 18, Pulse Oximetry: 94 %     Work Of Breathing / Effort: Accessory Muscle Use;Increased Work of Breathing;Nasal Flaring;Speech Limiting/Unable to complete full sentence  RUL Breath Sounds: Diminished, RML Breath Sounds: Diminished, RLL Breath Sounds: Diminished, IRLANDA Breath Sounds: Diminished, LLL Breath Sounds: Diminished  Fluids:  No intake or output data in the 24 hours ending 17 1225     GI/Nutrition:  Orders Placed This Encounter   Procedures   • Diet Order     Standing Status: Standing      Number of Occurrences: 1      Standing Expiration Date:      Order Specific Question:  Diet:     Answer:  Regular [1]     Medical Decision Making, by Problem:     Acute on chronic respiratory failure with hypoxia (HCC) -weaned to her baseline, CT negative for PE  -Recent fall could suggest rib fracture, none seen on CXR  -Prednisone 8mg daily  -CTA negative for  PE     ILD (interstitial lung disease) ([facility])   -Prednisone 8mg daily     Skin tear of right lower leg without complication  -Wound care     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

## 2017-01-09 PROCEDURE — 97535 SELF CARE MNGMENT TRAINING: CPT

## 2017-01-09 PROCEDURE — 700112 HCHG RX REV CODE 229: Performed by: HOSPITALIST

## 2017-01-09 PROCEDURE — 770006 HCHG ROOM/CARE - MED/SURG/GYN SEMI*

## 2017-01-09 PROCEDURE — A9270 NON-COVERED ITEM OR SERVICE: HCPCS | Performed by: HOSPITALIST

## 2017-01-09 PROCEDURE — 700111 HCHG RX REV CODE 636 W/ 250 OVERRIDE (IP): Performed by: INTERNAL MEDICINE

## 2017-01-09 PROCEDURE — 700101 HCHG RX REV CODE 250: Performed by: INTERNAL MEDICINE

## 2017-01-09 PROCEDURE — 700102 HCHG RX REV CODE 250 W/ 637 OVERRIDE(OP): Performed by: HOSPITALIST

## 2017-01-09 PROCEDURE — 99233 SBSQ HOSP IP/OBS HIGH 50: CPT | Performed by: INTERNAL MEDICINE

## 2017-01-09 PROCEDURE — 700111 HCHG RX REV CODE 636 W/ 250 OVERRIDE (IP): Performed by: HOSPITALIST

## 2017-01-09 RX ORDER — MEGESTROL ACETATE 40 MG/ML
800 SUSPENSION ORAL DAILY
Status: DISCONTINUED | OUTPATIENT
Start: 2017-01-09 | End: 2017-01-12 | Stop reason: HOSPADM

## 2017-01-09 RX ADMIN — FOLIC ACID 1 MG: 1 TABLET ORAL at 08:16

## 2017-01-09 RX ADMIN — ENOXAPARIN SODIUM 30 MG: 100 INJECTION SUBCUTANEOUS at 08:16

## 2017-01-09 RX ADMIN — TOLTERODINE TARTRATE 2 MG: 2 TABLET, FILM COATED ORAL at 09:32

## 2017-01-09 RX ADMIN — PREDNISONE 8 MG: 1 TABLET ORAL at 09:32

## 2017-01-09 RX ADMIN — MEGESTROL ACETATE 800 MG: 40 SUSPENSION ORAL at 17:53

## 2017-01-09 RX ADMIN — DOCUSATE SODIUM 100 MG: 100 CAPSULE ORAL at 08:16

## 2017-01-09 RX ADMIN — TOLTERODINE TARTRATE 2 MG: 2 TABLET, FILM COATED ORAL at 21:27

## 2017-01-09 ASSESSMENT — ENCOUNTER SYMPTOMS
SHORTNESS OF BREATH: 1
TREMORS: 0
FOCAL WEAKNESS: 0
BACK PAIN: 0
MYALGIAS: 0
SPUTUM PRODUCTION: 0
TINGLING: 0
NERVOUS/ANXIOUS: 0
NAUSEA: 0
WHEEZING: 0
ABDOMINAL PAIN: 0
DIAPHORESIS: 0
BLOOD IN STOOL: 0
WEAKNESS: 1
DEPRESSION: 1
SORE THROAT: 0
HEARTBURN: 0
HALLUCINATIONS: 0

## 2017-01-09 ASSESSMENT — PATIENT HEALTH QUESTIONNAIRE - PHQ9
1. LITTLE INTEREST OR PLEASURE IN DOING THINGS: NOT AT ALL
SUM OF ALL RESPONSES TO PHQ9 QUESTIONS 1 AND 2: 0
2. FEELING DOWN, DEPRESSED, IRRITABLE, OR HOPELESS: NOT AT ALL
SUM OF ALL RESPONSES TO PHQ QUESTIONS 1-9: 0

## 2017-01-09 ASSESSMENT — COPD QUESTIONNAIRES
DURING THE PAST 4 WEEKS HOW MUCH DID YOU FEEL SHORT OF BREATH: MOST  OR ALL OF THE TIME
HAVE YOU SMOKED AT LEAST 100 CIGARETTES IN YOUR ENTIRE LIFE: YES
DO YOU EVER COUGH UP ANY MUCUS OR PHLEGM?: YES, A FEW DAYS A WEEK OR MONTH
COPD SCREENING SCORE: 9

## 2017-01-09 ASSESSMENT — LIFESTYLE VARIABLES: DO YOU DRINK ALCOHOL: NO

## 2017-01-09 ASSESSMENT — PAIN SCALES - GENERAL
PAINLEVEL_OUTOF10: 0
PAINLEVEL_OUTOF10: 0

## 2017-01-09 NOTE — PROGRESS NOTES
Pt oriented to self only  [id number]   Denies N/T  Up with  1x assist to chair with OT   Wound noted to RLE, dressing in place  mepelex to spine, Q2H turns in place with pillows  IS encouraged  4L O2 in place via NC, baseline  Denies pain at this time  Bed/chair alarm on. Treaded socks on. SCDs in place. Call light within reach. Hourly rounding

## 2017-01-09 NOTE — THERAPY
"Occupational Therapy Treatment completed with focus on ADLs and ADL transfers.  Functional Status:  Pt Mod A for supine > EOB, and Mod A for EOB pivot transfer > chair. Pt demos grooming with min A for initiation, and thoroughness. Pt presents with anxiety and SOB with activity, and required extra time for all tasks. Pt agreed to stay up in chair for at least 30 min and try to eat some of her breakfast. Pt left with chair alarm, call light, tray table, and phone in reach. Nursing updated and aware of pt up in chair.  Pt will continue to benefit from OT services to maximize independence in ADL's and functional transfers.  Plan of Care: Will benefit from Occupational Therapy 3 times per week  Discharge Recommendations:  Equipment Will Continue to Assess for Equipment Needs. Post-acute therapy recommended after discharged home.    See \"Rehab Therapy-Acute\" Patient Summary Report for complete documentation.     Pt required encouragement to participate in OOB activity. Pt refused to go to the toilet and states she will go in her \"bag\" (brief). Pt states her granson will be able to care for her if her daughter is unable. Pt will need 24/7 supervision, and assistance with all ADLs and functional transfers.   "

## 2017-01-09 NOTE — PROGRESS NOTES
Pt not interested in her breakfast or lunch, says it is tasteless. Offered to call dietary to discuss options, pt declined. Requesting to go back to bed and rest. RLE dressing changed.

## 2017-01-09 NOTE — CARE PLAN
Problem: Venous Thromboembolism (VTW)/Deep Vein Thrombosis (DVT) Prevention:  Goal: Patient will participate in Venous Thrombosis (VTE)/Deep Vein Thrombosis (DVT)Prevention Measures  scds in place, lovenox in use    Problem: Skin Integrity  Goal: Risk for impaired skin integrity will decrease  q2h turns in place, up to chair prn

## 2017-01-09 NOTE — CARE PLAN
Problem: Safety  Goal: Will remain free from injury  Outcome: NOT MET  Patient is refusing to get out of bed and sit in the chair or ambulate. Staff is turning patient every two hours and repositioning with pillows. Nursing staff placing mepilex to bony prominences to help protect those areas from skin breakdown. Pillows are being utilized to float heels and turn patient side to side to protect coccyx. Patient has extremely fragile, papery and dry skin. Patient has very little adipose tissue on her body. Will continue to proactively protect skin with frequent turns and padding those areas that can be padded and reinforced with mepilex and pillows.

## 2017-01-09 NOTE — DISCHARGE PLANNING
TCN attempted to contact daughter via phone but no answer. TCN met with patient to discuss DC plan. Patient continues to report she wants to go home and her daughter and grandson will assist her. TCN explained to patient that we have been unable to contact her daughter to discuss this plan. Patient verbalized understanding. TCN requested additional phone number of family members but patient was unable to provide any. TCN to continue to follow for DC planning. SW aware and [employer] nexus search may be needed to locate additional family members.

## 2017-01-10 LAB
ALBUMIN SERPL BCP-MCNC: 2.5 G/DL (ref 3.2–4.9)
ALBUMIN/GLOB SERPL: 0.8 G/DL
ALP SERPL-CCNC: 59 U/L (ref 30–99)
ALT SERPL-CCNC: 9 U/L (ref 2–50)
ANION GAP SERPL CALC-SCNC: 4 MMOL/L (ref 0–11.9)
AST SERPL-CCNC: 19 U/L (ref 12–45)
BILIRUB SERPL-MCNC: 0.4 MG/DL (ref 0.1–1.5)
BUN SERPL-MCNC: 11 MG/DL (ref 8–22)
CALCIUM SERPL-MCNC: 8.6 MG/DL (ref 8.5–10.5)
CHLORIDE SERPL-SCNC: 97 MMOL/L (ref 96–112)
CO2 SERPL-SCNC: 34 MMOL/L (ref 20–33)
CREAT SERPL-MCNC: 0.49 MG/DL (ref 0.5–1.4)
GFR SERPL CREATININE-BSD FRML MDRD: >60 ML/MIN/1.73 M 2
GLOBULIN SER CALC-MCNC: 3.3 G/DL (ref 1.9–3.5)
GLUCOSE SERPL-MCNC: 92 MG/DL (ref 65–99)
POTASSIUM SERPL-SCNC: 4.4 MMOL/L (ref 3.6–5.5)
PROT SERPL-MCNC: 5.8 G/DL (ref 6–8.2)
SODIUM SERPL-SCNC: 135 MMOL/L (ref 135–145)

## 2017-01-10 PROCEDURE — 80053 COMPREHEN METABOLIC PANEL: CPT

## 2017-01-10 PROCEDURE — 700111 HCHG RX REV CODE 636 W/ 250 OVERRIDE (IP): Performed by: HOSPITALIST

## 2017-01-10 PROCEDURE — A9270 NON-COVERED ITEM OR SERVICE: HCPCS | Performed by: HOSPITALIST

## 2017-01-10 PROCEDURE — 97116 GAIT TRAINING THERAPY: CPT

## 2017-01-10 PROCEDURE — 700101 HCHG RX REV CODE 250: Performed by: INTERNAL MEDICINE

## 2017-01-10 PROCEDURE — 97535 SELF CARE MNGMENT TRAINING: CPT

## 2017-01-10 PROCEDURE — 99232 SBSQ HOSP IP/OBS MODERATE 35: CPT | Performed by: INTERNAL MEDICINE

## 2017-01-10 PROCEDURE — 700102 HCHG RX REV CODE 250 W/ 637 OVERRIDE(OP): Performed by: HOSPITALIST

## 2017-01-10 PROCEDURE — 97110 THERAPEUTIC EXERCISES: CPT

## 2017-01-10 PROCEDURE — 36415 COLL VENOUS BLD VENIPUNCTURE: CPT

## 2017-01-10 PROCEDURE — 700111 HCHG RX REV CODE 636 W/ 250 OVERRIDE (IP): Performed by: INTERNAL MEDICINE

## 2017-01-10 PROCEDURE — 770006 HCHG ROOM/CARE - MED/SURG/GYN SEMI*

## 2017-01-10 RX ADMIN — TOLTERODINE TARTRATE 2 MG: 2 TABLET, FILM COATED ORAL at 22:25

## 2017-01-10 RX ADMIN — FOLIC ACID 1 MG: 1 TABLET ORAL at 09:03

## 2017-01-10 RX ADMIN — ENOXAPARIN SODIUM 30 MG: 100 INJECTION SUBCUTANEOUS at 09:03

## 2017-01-10 RX ADMIN — MEGESTROL ACETATE 800 MG: 40 SUSPENSION ORAL at 09:03

## 2017-01-10 RX ADMIN — PREDNISONE 8 MG: 1 TABLET ORAL at 11:33

## 2017-01-10 RX ADMIN — TOLTERODINE TARTRATE 2 MG: 2 TABLET, FILM COATED ORAL at 09:03

## 2017-01-10 RX ADMIN — Medication 1 TABLET: at 22:25

## 2017-01-10 ASSESSMENT — ENCOUNTER SYMPTOMS
NAUSEA: 0
DIAPHORESIS: 0
TINGLING: 0
ABDOMINAL PAIN: 0
HALLUCINATIONS: 0
SPUTUM PRODUCTION: 0
TREMORS: 0
FOCAL WEAKNESS: 0

## 2017-01-10 ASSESSMENT — COPD QUESTIONNAIRES
HAVE YOU SMOKED AT LEAST 100 CIGARETTES IN YOUR ENTIRE LIFE: YES
COPD SCREENING SCORE: 9
DURING THE PAST 4 WEEKS HOW MUCH DID YOU FEEL SHORT OF BREATH: MOST  OR ALL OF THE TIME
DO YOU EVER COUGH UP ANY MUCUS OR PHLEGM?: YES, A FEW DAYS A WEEK OR MONTH

## 2017-01-10 ASSESSMENT — PATIENT HEALTH QUESTIONNAIRE - PHQ9
SUM OF ALL RESPONSES TO PHQ9 QUESTIONS 1 AND 2: 0
1. LITTLE INTEREST OR PLEASURE IN DOING THINGS: NOT AT ALL
SUM OF ALL RESPONSES TO PHQ QUESTIONS 1-9: 0
2. FEELING DOWN, DEPRESSED, IRRITABLE, OR HOPELESS: NOT AT ALL

## 2017-01-10 ASSESSMENT — LIFESTYLE VARIABLES
DO YOU DRINK ALCOHOL: NO
DO YOU DRINK ALCOHOL: NO

## 2017-01-10 ASSESSMENT — GAIT ASSESSMENTS
ASSISTIVE DEVICE: FRONT WHEEL WALKER
GAIT LEVEL OF ASSIST: MINIMAL ASSIST
DISTANCE (FEET): 10

## 2017-01-10 NOTE — CARE PLAN
Problem: Safety  Goal: Will remain free from injury  Intervention: Provide assistance with mobility  Hourly rounding, safety education, fall precautions including alarm, pt calls for assistance      Problem: Skin Integrity  Goal: Risk for impaired skin integrity will decrease  Intervention: Assess risk factors for impaired skin integrity and/or pressure ulcers  Every 2 hour turning schedule with pillows, frequent skin assessment, keep clean and dry, moisturizers, barrier creams and sacral dressings in place

## 2017-01-10 NOTE — DISCHARGE PLANNING
Received call from Ariela with [facility] patient is accepted. Notified RENAN Luz via voicemail.

## 2017-01-10 NOTE — CARE PLAN
Problem: Safety  Goal: Will remain free from injury  Outcome: PROGRESSING SLOWER THAN EXPECTED  Safety maintained. Patient is too afraid to try and get out of bed. Compliant with staff turning her Q 2  Hours to protect skin from breakdown.

## 2017-01-10 NOTE — DIETARY
"Nutrition Services: Consult for Poor PO Intake/low BMI. This is a 89 y.o female with admit dx: Shortness of breath. Ht: 5'1\"  Wt: 36.5 kg- stand up scale  BMI: 15.21 kg/m2- low BMI    Patient with history of end-stage interstitial fibrosis, on chronic prednisone who had a fall at home and suffered a significant skin tear on the lateral edge of her right leg per H&P. The patient is on a PO diet of regular with added boost BID, per recorded meals PO intake is between 0-75% of all meals.     Labs: Cr: 0.49 (L), Albumin: 2.5 (L)   Meds: Colace, Folic Acid, Megace, Deltasone  GI: last BM on 1/8  Skin: per wound RN note on 1/3 \" skin tear RLE\"    Estimated Nutritional Needs: MSJ x 1.2 = 867 kcals/day  Calories/day: 900-1100 ( 24.6-30.1 kcals/kg)  Protein/day: 44 -55 gm/day ( 1.2-1.5 gm/kg)    Plan: Dietary to continue with the supplements  Recommendations: Encourage good PO intake, calorie count in place from dinner 1/10 thru dinner 1/12, please fill out all meal tickets and place the filled out meal ticket in the folder that is hanging on the door   RD monitoring          "

## 2017-01-10 NOTE — DISCHARGE PLANNING
TCN spoke with patient daughter Luana, Luana requested patient DC to SNF as she is not currently able to physically assist patient and could not provide 24/7 Supervision. Patient daughter selected [facility] and [facility]. Choice signed and faxed to [facility]. TCN to speak with patient regarding patient daughter decision and to follow as needed for DC planning.

## 2017-01-10 NOTE — DISCHARGE PLANNING
Received choice form from [facility] at 0918. Referral sent to [facility] at 0922. Notified RENAN Luz that we will need a SNF order.

## 2017-01-10 NOTE — PROGRESS NOTES
Hospital Medicine Progress Note, Adult, Complex               Author: Sherry Contreras Date & Time created: 1/9/2017  4:27 PM     CC: SOB    Interval History:  89F hx End stage 4L O2 and steroid dependent COPD and ILD and RA admitted after a fall with a R leg skin tear, found to have bandemia.  CXR is clear, she is at her baseline O2 requirement.  1/4: Still increased WOB, sats high 80s on baseline 4L, high DDimer, CTA ordered.  Pred incr 7-->8  1/5: Pending SNF placement, CTA negative for PE  1/7: Pending SNF placement, continue supplemental O2  1/8:  Pending snf, no new events, encourage IS use, 500 to 800 ml  1/9:  1 person assist, fatigues easily, wants to go home with family    Review of Systems:  Review of Systems   Constitutional: Negative for malaise/fatigue and diaphoresis.   HENT: Positive for hearing loss. Negative for sore throat.    Respiratory: Positive for shortness of breath. Negative for sputum production and wheezing.    Cardiovascular: Negative for chest pain and leg swelling.   Gastrointestinal: Negative for heartburn, nausea, abdominal pain and blood in stool.   Genitourinary: Negative for dysuria and frequency.   Musculoskeletal: Negative for myalgias and back pain.   Neurological: Positive for weakness. Negative for tingling, tremors and focal weakness.   Psychiatric/Behavioral: Positive for depression. Negative for hallucinations. The patient is not nervous/anxious.    All other systems reviewed and are negative.      Physical Exam:  Physical Exam   Constitutional: She is oriented to person, place, and time. No distress.   Thin, elderly   HENT:   Head: Normocephalic and atraumatic.   Eyes: Conjunctivae and EOM are normal. Pupils are equal, round, and reactive to light.   Neck: Normal range of motion. Neck supple.   Cardiovascular: Normal rate, regular rhythm and intact distal pulses.    Pulmonary/Chest: Effort normal. No respiratory distress. She has no wheezes.   Shallow inspirations    Abdominal: Soft. Bowel sounds are normal. She exhibits no distension. There is no tenderness.   Musculoskeletal: She exhibits no edema or tenderness.   Neurological: She is alert and oriented to person, place, and time. No cranial nerve deficit. Coordination normal.   Skin: Skin is warm and dry. She is not diaphoretic.   ecchymosis   Psychiatric: She has a normal mood and affect. Her behavior is normal.   Nursing note and vitals reviewed.      Labs:        Invalid input(s): [id number]      No results for input(s): SODIUM, POTASSIUM, CHLORIDE, CO2, BUN, CREATININE, MAGNESIUM, PHOSPHORUS, CALCIUM in the last 72 hours.  No results for input(s): ALTSGPT, ASTSGOT, ALKPHOSPHAT, TBILIRUBIN, DBILIRUBIN, GAMMAGT, AMYLASE, LIPASE, ALB, PREALBUMIN, GLUCOSE in the last 72 hours.  No results for input(s): RBC, HEMOGLOBIN, HEMATOCRIT, PLATELETCT, PROTHROMBTM, APTT, INR, IRON, FERRITIN, TOTIRONBC in the last 72 hours.            Hemodynamics:  Temp (24hrs), Av °C (98.6 °F), Min:36.4 °C (97.5 °F), Max:37.4 °C (99.3 °F)  Temperature: 37.4 °C (99.3 °F)  Pulse  Av.1  Min: 53  Max: 111   Blood Pressure : 152/81 mmHg     Respiratory:    Respiration: 18, Pulse Oximetry: 99 %     Work Of Breathing / Effort: Speech Limiting/Unable to complete full sentence;Accessory Muscle Use;Moderate  RUL Breath Sounds: Diminished, RML Breath Sounds: Diminished, RLL Breath Sounds: Diminished, IRLANDA Breath Sounds: Diminished, LLL Breath Sounds: Diminished  Fluids:  No intake or output data in the 24 hours ending 17 1627     GI/Nutrition:  Orders Placed This Encounter   Procedures   • Diet Order     Standing Status: Standing      Number of Occurrences: 1      Standing Expiration Date:      Order Specific Question:  Diet:     Answer:  Regular [1]     Medical Decision Making, by Problem:     Acute on chronic respiratory failure with hypoxia (HCC) -weaned to her baseline, CT negative for PE  -Recent fall could suggest rib fracture, none  seen on CXR  -Prednisone 8mg daily  -CTA negative for PE     ILD (interstitial lung disease) ([facility])   -Prednisone 8mg daily     Skin tear of right lower leg without complication  -Wound care     End stage steroid and 4L O2 dependent COPD (chronic obstructive pulmonary disease) ([facility])   -O2 at 4L     H/O rheumatoid arthritis   -On Methotrexate at home  -Resume on AM    Hypophosphatemia   -Replace PO    - Severe protein calorie malnutrition- encourage oral intake, add megace  Dietary consult  - add boost tid      Dispo: Mobilize, encourage IS use  Does not want to go to snf, medically cleared for snf or home with home health if there is 24 hour home support,   - call place to Luana orr VM left  -SW assisting        Labs reviewed and Medications reviewed  Conrad catheter: No Conrad      DVT Prophylaxis: Enoxaparin (Lovenox)  DVT prophylaxis - mechanical: SCDs  Ulcer prophylaxis: Not indicated    Assessed for rehab: Patient was assess for and/or received rehabilitation services during this hospitalization

## 2017-01-10 NOTE — THERAPY
" Patient presents with decreased functional mobility status secondary to listed deficits and problems. Pt still need MIN CGA for bed mob with HOB 30 * using handrail to scoot, MIN/CGA STS MIN A for stpvt, 180* turns and gait forwards backwards 10 feet x 4 with MIN MOD A w/FWW and frequent seated rest stops between sets. Pts O2 sats with exertion were 90% or above.  Patient will benefit from continued acute and post acute PT services.Physical Therapy Treatment completed.   Bed Mobility:  Supine to Sit: Contact Guard Assist  Transfers: Sit to Stand: Minimal Assist  Gait: Level Of Assist: Minimal Assist with Front-Wheel Walker       Plan of Care: Will benefit from Physical Therapy 3 times per week  Discharge Recommendations: Equipment: Front-Wheel Walker. Post-acute therapy recommended before discharged home.     See \"Rehab Therapy-Acute\" Patient Summary Report for complete documentation.       "

## 2017-01-10 NOTE — CARE PLAN
Problem: Nutritional:  Goal: Achieve adequate nutritional intake  Patient will consume 50% of meals  Outcome: NOT MET

## 2017-01-11 PROBLEM — E83.39 HYPOPHOSPHATEMIA: Status: ACTIVE | Noted: 2017-01-11

## 2017-01-11 PROBLEM — E43 SEVERE PROTEIN-CALORIE MALNUTRITION (HCC): Status: ACTIVE | Noted: 2017-01-11

## 2017-01-11 PROBLEM — R53.81 PHYSICAL DEBILITY: Status: ACTIVE | Noted: 2017-01-11

## 2017-01-11 PROCEDURE — 700101 HCHG RX REV CODE 250: Performed by: INTERNAL MEDICINE

## 2017-01-11 PROCEDURE — 700111 HCHG RX REV CODE 636 W/ 250 OVERRIDE (IP): Performed by: INTERNAL MEDICINE

## 2017-01-11 PROCEDURE — 99239 HOSP IP/OBS DSCHRG MGMT >30: CPT | Performed by: INTERNAL MEDICINE

## 2017-01-11 PROCEDURE — 700111 HCHG RX REV CODE 636 W/ 250 OVERRIDE (IP): Performed by: HOSPITALIST

## 2017-01-11 PROCEDURE — 700102 HCHG RX REV CODE 250 W/ 637 OVERRIDE(OP): Performed by: HOSPITALIST

## 2017-01-11 PROCEDURE — A9270 NON-COVERED ITEM OR SERVICE: HCPCS | Performed by: HOSPITALIST

## 2017-01-11 PROCEDURE — 770006 HCHG ROOM/CARE - MED/SURG/GYN SEMI*

## 2017-01-11 RX ORDER — ACETAMINOPHEN 325 MG/1
650 TABLET ORAL EVERY 6 HOURS PRN
Qty: 30 TAB | Refills: 0 | Status: SHIPPED | OUTPATIENT
Start: 2017-01-11

## 2017-01-11 RX ORDER — PREDNISONE 10 MG/1
7 TABLET ORAL DAILY
Qty: 30 TAB | Refills: 0 | Status: SHIPPED | DISCHARGE
Start: 2017-01-11

## 2017-01-11 RX ORDER — MEGESTROL ACETATE 40 MG/ML
800 SUSPENSION ORAL DAILY
Qty: 1 BOTTLE | Status: SHIPPED | DISCHARGE
Start: 2017-01-11

## 2017-01-11 RX ORDER — TOLTERODINE TARTRATE 2 MG/1
2 TABLET, EXTENDED RELEASE ORAL 2 TIMES DAILY
Qty: 60 TAB | Refills: 3 | Status: SHIPPED | DISCHARGE
Start: 2017-01-11

## 2017-01-11 RX ORDER — ONDANSETRON 4 MG/1
4 TABLET, ORALLY DISINTEGRATING ORAL EVERY 4 HOURS PRN
Qty: 10 TAB | Refills: 0 | Status: SHIPPED | DISCHARGE
Start: 2017-01-11

## 2017-01-11 RX ADMIN — PREDNISONE 8 MG: 1 TABLET ORAL at 09:05

## 2017-01-11 RX ADMIN — FOLIC ACID 1 MG: 1 TABLET ORAL at 09:05

## 2017-01-11 RX ADMIN — ENOXAPARIN SODIUM 30 MG: 100 INJECTION SUBCUTANEOUS at 09:05

## 2017-01-11 RX ADMIN — TOLTERODINE TARTRATE 2 MG: 2 TABLET, FILM COATED ORAL at 23:19

## 2017-01-11 RX ADMIN — MEGESTROL ACETATE 800 MG: 40 SUSPENSION ORAL at 09:05

## 2017-01-11 RX ADMIN — METHOTREXATE 15 MG: 2.5 TABLET ORAL at 11:59

## 2017-01-11 RX ADMIN — TOLTERODINE TARTRATE 2 MG: 2 TABLET, FILM COATED ORAL at 09:00

## 2017-01-11 ASSESSMENT — LIFESTYLE VARIABLES
DO YOU DRINK ALCOHOL: NO
DO YOU DRINK ALCOHOL: NO

## 2017-01-11 ASSESSMENT — COPD QUESTIONNAIRES
DO YOU EVER COUGH UP ANY MUCUS OR PHLEGM?: YES, A FEW DAYS A WEEK OR MONTH
HAVE YOU SMOKED AT LEAST 100 CIGARETTES IN YOUR ENTIRE LIFE: YES
COPD SCREENING SCORE: 9
DO YOU EVER COUGH UP ANY MUCUS OR PHLEGM?: YES, A FEW DAYS A WEEK OR MONTH
DURING THE PAST 4 WEEKS HOW MUCH DID YOU FEEL SHORT OF BREATH: MOST  OR ALL OF THE TIME
COPD SCREENING SCORE: 8
HAVE YOU SMOKED AT LEAST 100 CIGARETTES IN YOUR ENTIRE LIFE: YES
DURING THE PAST 4 WEEKS HOW MUCH DID YOU FEEL SHORT OF BREATH: MOST  OR ALL OF THE TIME

## 2017-01-11 ASSESSMENT — PATIENT HEALTH QUESTIONNAIRE - PHQ9
SUM OF ALL RESPONSES TO PHQ QUESTIONS 1-9: 0
SUM OF ALL RESPONSES TO PHQ9 QUESTIONS 1 AND 2: 0
2. FEELING DOWN, DEPRESSED, IRRITABLE, OR HOPELESS: NOT AT ALL
1. LITTLE INTEREST OR PLEASURE IN DOING THINGS: NOT AT ALL

## 2017-01-11 ASSESSMENT — ENCOUNTER SYMPTOMS
TREMORS: 0
VOMITING: 0
DIAPHORESIS: 0
TINGLING: 0
HALLUCINATIONS: 0
ABDOMINAL PAIN: 0
NAUSEA: 0
SHORTNESS OF BREATH: 1
NERVOUS/ANXIOUS: 1
CONSTIPATION: 0
SPUTUM PRODUCTION: 0
WEAKNESS: 1
FOCAL WEAKNESS: 0

## 2017-01-11 NOTE — DISCHARGE PLANNING
TCN met with patient at bedside to request consent to transfer to DC patient declined to approve transport to SNF. Patient aware of potential medical costs associated with remaining in the hospital despite being medically clear and having a safe and accepting facility. SW and SW supervisor aware. TCN to follow as needed.

## 2017-01-11 NOTE — PROGRESS NOTES
Hospital Medicine Progress Note, Adult, Complex               Author: Raizamarie Weissshan Date & Time created: 1/10/2017  6:11 PM     CC: SOB    Interval History:  89F hx End stage 4L O2 and steroid dependent COPD and ILD and RA admitted after a fall with a R leg skin tear, found to have bandemia.  CXR is clear, she is at her baseline O2 requirement.  1/4: Still increased WOB, sats high 80s on baseline 4L, high DDimer, CTA ordered.  Pred incr 7-->8  1/5: Pending SNF placement, CTA negative for PE  1/7: Pending SNF placement, continue supplemental O2  1/8:  Pending snf, no new events, encourage IS use, 500 to 800 ml  1/9:  1 person assist, fatigues easily, wants to go home with family  1/10: Discussed SNF, still not agreeable- discussed that family needs to be present 24/7 for safety and PT has not yet indicated that she is safe to be alone    Review of Systems:  Review of Systems   Constitutional: Negative for malaise/fatigue and diaphoresis.   Respiratory: Positive for shortness of breath (near baseline per pt). Negative for sputum production.    Cardiovascular: Negative for chest pain.   Gastrointestinal: Negative for nausea, vomiting, abdominal pain and constipation.   Genitourinary: Negative for dysuria.   Neurological: Positive for weakness (improved). Negative for tingling, tremors and focal weakness.   Psychiatric/Behavioral: Negative for hallucinations. The patient is nervous/anxious.    All other systems reviewed and are negative.      Physical Exam:  Physical Exam   Constitutional: She is oriented to person, place, and time. No distress.   Thin, frail   HENT:   Head: Normocephalic and atraumatic.   Eyes: Conjunctivae and EOM are normal. Pupils are equal, round, and reactive to light.   Neck: Normal range of motion. Neck supple.   Cardiovascular: Normal rate, regular rhythm and intact distal pulses.    Pulmonary/Chest: Effort normal. No respiratory distress. She has wheezes.   Poor breath sounds and faint  "wheezes   Abdominal: Soft. Bowel sounds are normal. She exhibits no distension. There is no tenderness.   Musculoskeletal: She exhibits no edema or tenderness.   Neurological: She is alert and oriented to person, place, and time. No cranial nerve deficit. Coordination normal.   Skin: Skin is warm and dry. She is not diaphoretic.   Ecchymosis, tears (many)   Psychiatric:   Anxious, poor judgement  \" the nursing home just wants my money\"   Nursing note and vitals reviewed.      Labs:        Invalid input(s): [id number]      Recent Labs      01/10/17   0230   SODIUM  135   POTASSIUM  4.4   CHLORIDE  97   CO2  34*   BUN  11   CREATININE  0.49*   CALCIUM  8.6     Recent Labs      01/10/17   0230   ALTSGPT  9   ASTSGOT  19   ALKPHOSPHAT  59   TBILIRUBIN  0.4   GLUCOSE  92     No results for input(s): RBC, HEMOGLOBIN, HEMATOCRIT, PLATELETCT, PROTHROMBTM, APTT, INR, IRON, FERRITIN, TOTIRONBC in the last 72 hours.  Recent Labs      01/10/17   0230   ASTSGOT  19   ALTSGPT  9   ALKPHOSPHAT  59   TBILIRUBIN  0.4           Hemodynamics:  Temp (24hrs), Av.9 °C (98.5 °F), Min:36.4 °C (97.6 °F), Max:37.4 °C (99.3 °F)  Temperature: 36.4 °C (97.6 °F)  Pulse  Av.6  Min: 53  Max: 111   Blood Pressure : 115/86 mmHg     Respiratory:    Respiration: 17, Pulse Oximetry: 97 %     Work Of Breathing / Effort: Speech Limiting/Unable to complete full sentence;Accessory Muscle Use;Moderate  RUL Breath Sounds: Diminished, RML Breath Sounds: Diminished, RLL Breath Sounds: Diminished, IRLANDA Breath Sounds: Diminished, LLL Breath Sounds: Diminished  Fluids:  No intake or output data in the 24 hours ending 01/10/17 1811     GI/Nutrition:  Orders Placed This Encounter   Procedures   • Diet Order     Standing Status: Standing      Number of Occurrences: 1      Standing Expiration Date:      Order Specific Question:  Diet:     Answer:  Regular [1]     Medical Decision Making, by Problem:     Acute on chronic respiratory failure with hypoxia " ([facility]) -weaned to her baseline, CT negative for PE     ILD (interstitial lung disease) ([facility])   -Prednisone 8mg daily     Skin tear of right lower leg without complication  -Wound care     End stage steroid and 4L O2 dependent COPD (chronic obstructive pulmonary disease) ([facility])   -O2 at 4L     H/O rheumatoid arthritis   -On Methotrexate - resume      Hypophosphatemia   -Replaced    - Severe protein calorie malnutrition- on supplements            Labs reviewed and Medications reviewed  Conrad catheter: No Conrad      DVT Prophylaxis: Enoxaparin (Lovenox)  DVT prophylaxis - mechanical: SCDs  Ulcer prophylaxis: Not indicated    Assessed for rehab: Patient was assess for and/or received rehabilitation services during this hospitalization

## 2017-01-11 NOTE — DISCHARGE PLANNING
TCN attempted to contact patient daughter to inform her of patient DC to  today.  Patient daughter phone is currently disconnected. TCN attempted to speak with patient to confirm DC and transport to  today but patient was asleep. TCN/SW to follow up to attempt to get consent for DC and transport by end of today.

## 2017-01-11 NOTE — CARE PLAN
Problem: Nutritional:  Goal: Achieve adequate nutritional intake  Patient will consume 50% of meals   Outcome: MET Date Met:  01/11/17

## 2017-01-11 NOTE — PROGRESS NOTES
Bedside report received from tomas RN, pt resting quietly with no s/s of discomfort or distress noted, oxygen by NC, IV s/l, dressing to RLE CDI, pillows in place for positioning and comfort, fall precautions including bed alarm, pt calls for assist.

## 2017-01-11 NOTE — WOUND TEAM
Wound consult placed for evaluation of upper back/spine pressure ulcer.  Peeled back mepilex foam dressing and found all skin intact and blanching.  Replaced mepilex dressing for prevention.

## 2017-01-11 NOTE — CARE PLAN
Problem: Safety  Goal: Will remain free from injury  Intervention: Provide assistance with mobility  Hourly rounding, safety education, fall precautions including bed alarm, pt calls for assistance      Problem: Skin Integrity  Goal: Risk for impaired skin integrity will decrease  Intervention: Assess risk factors for impaired skin integrity and/or pressure ulcers  Every 2 hour turning with pillows, moisturizer and barrier creams as needed

## 2017-01-11 NOTE — CARE PLAN
Problem: Safety  Goal: Will remain free from injury  Outcome: PROGRESSING SLOWER THAN EXPECTED  Safety maintained. Assisting patient with Q 2 hour turns. Patient refusing to get out of bed this morning. Will see if PT/OT can talk her into getting up for breakfast.

## 2017-01-12 ENCOUNTER — RESOLUTE PROFESSIONAL BILLING HOSPITAL PROF FEE (OUTPATIENT)
Dept: HOSPITALIST | Facility: SKILLED NURSING FACILITY | Age: 82
End: 2017-01-12
Payer: MEDICARE

## 2017-01-12 VITALS
HEIGHT: 61 IN | WEIGHT: 81.57 LBS | DIASTOLIC BLOOD PRESSURE: 67 MMHG | SYSTOLIC BLOOD PRESSURE: 100 MMHG | RESPIRATION RATE: 16 BRPM | OXYGEN SATURATION: 96 % | HEART RATE: 99 BPM | BODY MASS INDEX: 15.4 KG/M2 | TEMPERATURE: 99.2 F

## 2017-01-12 PROCEDURE — 700101 HCHG RX REV CODE 250: Performed by: INTERNAL MEDICINE

## 2017-01-12 PROCEDURE — A9270 NON-COVERED ITEM OR SERVICE: HCPCS | Performed by: HOSPITALIST

## 2017-01-12 PROCEDURE — 700102 HCHG RX REV CODE 250 W/ 637 OVERRIDE(OP): Performed by: HOSPITALIST

## 2017-01-12 PROCEDURE — 700112 HCHG RX REV CODE 229: Performed by: HOSPITALIST

## 2017-01-12 PROCEDURE — 99306 1ST NF CARE HIGH MDM 50: CPT | Mod: AI | Performed by: FAMILY MEDICINE

## 2017-01-12 PROCEDURE — 700111 HCHG RX REV CODE 636 W/ 250 OVERRIDE (IP): Performed by: INTERNAL MEDICINE

## 2017-01-12 PROCEDURE — 700111 HCHG RX REV CODE 636 W/ 250 OVERRIDE (IP): Performed by: HOSPITALIST

## 2017-01-12 RX ADMIN — TOLTERODINE TARTRATE 2 MG: 2 TABLET, FILM COATED ORAL at 09:00

## 2017-01-12 RX ADMIN — FOLIC ACID 1 MG: 1 TABLET ORAL at 09:00

## 2017-01-12 RX ADMIN — DOCUSATE SODIUM 100 MG: 100 CAPSULE ORAL at 09:00

## 2017-01-12 RX ADMIN — PREDNISONE 8 MG: 1 TABLET ORAL at 09:00

## 2017-01-12 RX ADMIN — ENOXAPARIN SODIUM 30 MG: 100 INJECTION SUBCUTANEOUS at 09:00

## 2017-01-12 RX ADMIN — MEGESTROL ACETATE 800 MG: 40 SUSPENSION ORAL at 09:00

## 2017-01-12 ASSESSMENT — ENCOUNTER SYMPTOMS: SHORTNESS OF BREATH: 1

## 2017-01-12 ASSESSMENT — PAIN SCALES - GENERAL: PAINLEVEL_OUTOF10: 0

## 2017-01-12 NOTE — DISCHARGE PLANNING
Medical Social Work    Pt's scheduled for transport tomorrow at 9:30am to [facility].

## 2017-01-12 NOTE — DISCHARGE PLANNING
Medical Social Work    SW placed call to transcription at [phone] to request pt's d/c summary be expedited.

## 2017-01-12 NOTE — PROGRESS NOTES
Hospital Medicine Progress Note, Adult, Complex               Author: Raizamarie Weissshna Date & Time created: 1/11/2017  5:22 PM     CC: SOB    Interval History:  89F hx End stage 4L O2 and steroid dependent COPD and ILD and RA admitted after a fall with a R leg skin tear, found to have bandemia.  CXR is clear, she is at her baseline O2 requirement.  1/4: Still increased WOB, sats high 80s on baseline 4L, high DDimer, CTA ordered.  Pred incr 7-->8  1/5: Pending SNF placement, CTA negative for PE  1/7: Pending SNF placement, continue supplemental O2  1/8:  Pending snf, no new events, encourage IS use, 500 to 800 ml  1/9:  1 person assist, fatigues easily, wants to go home with family  1/10: Discussed SNF, still not agreeable- discussed that family needs to be present 24/7 for safety and PT has not yet indicated that she is safe to be alone  1/11: no change, remains irrtable. Daughter OK'd xfer to [facility]    Review of Systems:  Review of Systems   Constitutional: Negative for malaise/fatigue and diaphoresis.   Respiratory: Positive for shortness of breath (baseline). Negative for sputum production.    Cardiovascular: Negative for chest pain.   Gastrointestinal: Negative for nausea, vomiting, abdominal pain and constipation.   Genitourinary: Negative for dysuria.   Musculoskeletal:        Tenderness in legs   Neurological: Positive for weakness (improved). Negative for tingling, tremors and focal weakness.   Psychiatric/Behavioral: Negative for hallucinations. The patient is nervous/anxious.    All other systems reviewed and are negative.      Physical Exam:  Physical Exam   Constitutional: She is oriented to person, place, and time. No distress.   Thin, frail   HENT:   Head: Normocephalic and atraumatic.   Eyes: Conjunctivae and EOM are normal. Pupils are equal, round, and reactive to light.   Neck: Normal range of motion. Neck supple.   Cardiovascular: Normal rate, regular rhythm and intact distal pulses.     Pulmonary/Chest: Effort normal. No respiratory distress. She has wheezes (improved).   Poor breath sounds   Abdominal: Soft. Bowel sounds are normal. She exhibits no distension. There is no tenderness.   Musculoskeletal: She exhibits tenderness. Edema: trace anasarca.   Neurological: She is alert and oriented to person, place, and time. No cranial nerve deficit. Coordination normal.   Skin: Skin is warm and dry. She is not diaphoretic.   Ecchymosis, tears (many)   Psychiatric:   irritable   Nursing note and vitals reviewed.      Labs:        Invalid input(s): [id number]      Recent Labs      01/10/17   023   SODIUM  135   POTASSIUM  4.4   CHLORIDE  97   CO2  34*   BUN  11   CREATININE  0.49*   CALCIUM  8.6     Recent Labs      01/10/17   0230   ALTSGPT  9   ASTSGOT  19   ALKPHOSPHAT  59   TBILIRUBIN  0.4   GLUCOSE  92     No results for input(s): RBC, HEMOGLOBIN, HEMATOCRIT, PLATELETCT, PROTHROMBTM, APTT, INR, IRON, FERRITIN, TOTIRONBC in the last 72 hours.  Recent Labs      01/10/17   0230   ASTSGOT  19   ALTSGPT  9   ALKPHOSPHAT  59   TBILIRUBIN  0.4           Hemodynamics:  Temp (24hrs), Av °C (98.6 °F), Min:36.8 °C (98.2 °F), Max:37.3 °C (99.2 °F)  Temperature: 36.9 °C (98.4 °F)  Pulse  Av.8  Min: 53  Max: 111   Blood Pressure : 103/66 mmHg     Respiratory:    Respiration: 19, Pulse Oximetry: 91 %     Work Of Breathing / Effort: Speech Limiting/Unable to complete full sentence;Accessory Muscle Use;Moderate  RUL Breath Sounds: Diminished, RML Breath Sounds: Diminished, RLL Breath Sounds: Diminished, IRLANDA Breath Sounds: Diminished, LLL Breath Sounds: Diminished  Fluids:    Intake/Output Summary (Last 24 hours) at 17 1722  Last data filed at 17 1200   Gross per 24 hour   Intake    360 ml   Output      0 ml   Net    360 ml     Weight: 37 kg (81 lb 9.1 oz)  GI/Nutrition:  Orders Placed This Encounter   Procedures   • Diet Order     Standing Status: Standing      Number of Occurrences: 1       Standing Expiration Date:      Order Specific Question:  Diet:     Answer:  Regular [1]     Medical Decision Making, by Problem:     Acute on chronic respiratory failure with hypoxia ([facility]) -weaned to her baseline, CT negative for PE     ILD (interstitial lung disease) ([facility])   -Prednisone 8mg daily     Skin tear of right lower leg without complication  -Wound care     End stage steroid and 4L O2 dependent COPD (chronic obstructive pulmonary disease) ([facility])   -O2 at 4L     H/O rheumatoid arthritis   -On Methotrexate - resume      Hypophosphatemia   -Replaced    - Severe protein calorie malnutrition- on supplements    Pending SNF transfer        Labs reviewed and Medications reviewed  Conrad catheter: No Conrad      DVT Prophylaxis: Enoxaparin (Lovenox)  DVT prophylaxis - mechanical: SCDs  Ulcer prophylaxis: Not indicated    Assessed for rehab: Patient was assess for and/or received rehabilitation services during this hospitalization

## 2017-01-12 NOTE — PROGRESS NOTES
Patient discharged to [facility]. Report called all belongings with patient, paper work complete.

## 2017-01-12 NOTE — DISCHARGE PLANNING
Transport arranged with Katya. Patient will be leaving tomorrow @0930 via [facility] going to [facility]. RENAN Luz notified.

## 2017-01-12 NOTE — DISCHARGE PLANNING
Medical Social Work    SW placed call to pt's daughter Luana (ph: [phone]). Per Luana, she is agreeable for pt to d/c to [facility]. RENAN explained due to road conditions, pt is unable to transfer to [facility] at this time. However, pt may be able to transfer to [facility] from [facility] when the roads clear. Pt's daughter states she is agreeable to this plan and was given [facility]'s contact information.

## 2017-01-12 NOTE — DISCHARGE INSTRUCTIONS
Discharge Instructions    Discharged to other by medical transportation with escort. Discharged via wheelchair, hospital escort: Yes.  Special equipment needed: Walker    Be sure to schedule a follow-up appointment with your primary care doctor or any specialists as instructed.     Discharge Plan:   Diet Plan: Discussed  Activity Level: Discussed  Confirmed Follow up Appointment: Patient to Call and Schedule Appointment  Confirmed Symptoms Management: Discussed  Medication Reconciliation Updated: Yes  Influenza Vaccine Indication: Not indicated: Previously immunized this influenza season and > 8 years of age    I understand that a diet low in cholesterol, fat, and sodium is recommended for good health. Unless I have been given specific instructions below for another diet, I accept this instruction as my diet prescription.   Other diet: As tolerated      Special Instructions: Discharge instructions for the Orthopedic Patient    Follow up with Primary Care Physician within 2 weeks of discharge to home, regarding:  Review of medications and diagnostic testing.  Surveillance for medical complications.  Workup and treatment of osteoporosis, if appropriate.     -Is this a Joint Replacement patient? No    -Is this patient being discharged with medication to prevent blood clots?  Yes, Lovenox Enoxaparin injection  What is this medicine?  ENOXAPARIN (ee nox a PA rin) is used after knee, hip, or abdominal surgeries to prevent blood clotting. It is also used to treat existing blood clots in the lungs or in the veins.  This medicine may be used for other purposes; ask your health care provider or pharmacist if you have questions.  COMMON BRAND NAME(S): Lovenox  What should I tell my health care provider before I take this medicine?  They need to know if you have any of these conditions:  -bleeding disorders, hemorrhage, or hemophilia  -infection of the heart or heart valves  -kidney or liver disease  -previous  stroke  -prosthetic heart valve  -recent surgery or delivery of a baby  -ulcer in the stomach or intestine, diverticulitis, or other bowel disease  -an unusual or allergic reaction to enoxaparin, heparin, pork or pork products, other medicines, foods, dyes, or preservatives  -pregnant or trying to get pregnant  -breast-feeding  How should I use this medicine?  This medicine is for injection under the skin. It is usually given by a health-care professional. You or a family member may be trained on how to give the injections. If you are to give yourself injections, make sure you understand how to use the syringe, measure the dose if necessary, and give the injection. To avoid bruising, do not rub the site where this medicine has been injected. Do not take your medicine more often than directed. Do not stop taking except on the advice of your doctor or health care professional.  Make sure you receive a puncture-resistant container to dispose of the needles and syringes once you have finished with them. Do not reuse these items. Return the container to your doctor or health care professional for proper disposal.  Talk to your pediatrician regarding the use of this medicine in children. Special care may be needed.  Overdosage: If you think you have taken too much of this medicine contact a poison control center or emergency room at once.  NOTE: This medicine is only for you. Do not share this medicine with others.  What if I miss a dose?  If you miss a dose, take it as soon as you can. If it is almost time for your next dose, take only that dose. Do not take double or extra doses.  What may interact with this medicine?  Do not take this medicine with any of the following medications:  -aspirin and aspirin-like medicines  -heparin  -mifepristone  -palifermin  -warfarin   This medicine may also interact with the following medications:  -cilostazol  -clopidogrel  -dipyridamole  -NSAIDs, medicines for pain and inflammation,  like ibuprofen or naproxen  -sulfinpyrazone  -ticlopidine  This list may not describe all possible interactions. Give your health care provider a list of all the medicines, herbs, non-prescription drugs, or dietary supplements you use. Also tell them if you smoke, drink alcohol, or use illegal drugs. Some items may interact with your medicine.  What should I watch for while using this medicine?  Visit your doctor or health care professional for regular checks on your progress. Your condition will be monitored carefully while you are receiving this medicine.  If you are going to have surgery, tell your doctor or health care professional that you are taking this medicine.  Do not stop taking this medicine without first talking to your doctor. Be sure to refill your prescription before you run out of medicine.  Avoid sports and activities that might cause injury while you are using this medicine. Severe falls or injuries can cause unseen bleeding. Be careful when using sharp tools or knives. Consider using an electric razor. Take special care brushing or flossing your teeth. Report any injuries, bruising, or red spots on the skin to your doctor or health care professional.  What side effects may I notice from receiving this medicine?  Side effects that you should report to your doctor or health care professional as soon as possible:  -allergic reactions like skin rash, itching or hives, swelling of the face, lips, or tongue  -dark urine  -feeling faint or lightheaded, falls  -fever  -heavy menstrual bleeding  -signs and symptoms of bleeding such as bloody or black, tarry stools; red or dark-brown urine; spitting up blood or brown material that looks like coffee grounds; red spots on the skin; unusual bruising or bleeding from the eye, gums, or nose  -signs and symptoms of a blood clot such as breathing problems; changes in vision; chest pain; severe, sudden headache; pain, swelling, warmth in the leg; trouble speaking;  sudden numbness or weakness of the face, arm or leg  Side effects that usually do not require medical attention (report to your doctor or health care professional if they continue or are bothersome):  -pain or irritation at the injection site  This list may not describe all possible side effects. Call your doctor for medical advice about side effects. You may report side effects to FDA at 6-283-XAE-2666.  Where should I keep my medicine?  Keep out of the reach of children.  Store at room temperature between 15 and 30 degrees C (59 and 86 degrees F). Do not freeze. If your injections have been specially prepared, you may need to store them in the refrigerator. Ask your pharmacist. Throw away any unused medicine after the expiration date.  NOTE: This sheet is a summary. It may not cover all possible information. If you have questions about this medicine, talk to your doctor, pharmacist, or health care provider.  © 2014, [employer]. (3/31/2014 10:04:27 AM)      · Is patient discharged on Warfarin / Coumadin?   No     · Is patient Post Blood Transfusion?  No    Depression / Suicide Risk    As you are discharged from this [facility] facility, it is important to learn how to keep safe from harming yourself.    Recognize the warning signs:  · Abrupt changes in personality, positive or negative- including increase in energy   · Giving away possessions  · Change in eating patterns- significant weight changes-  positive or negative  · Change in sleeping patterns- unable to sleep or sleeping all the time   · Unwillingness or inability to communicate  · Depression  · Unusual sadness, discouragement and loneliness  · Talk of wanting to die  · Neglect of personal appearance   · Rebelliousness- reckless behavior  · Withdrawal from people/activities they love  · Confusion- inability to concentrate     If you or a loved one observes any of these behaviors or has concerns about self-harm, here's what you can  do:  · Talk about it- your feelings and reasons for harming yourself  · Remove any means that you might use to hurt yourself (examples: pills, rope, extension cords, firearm)  · Get professional help from the community (Mental Health, Substance Abuse, psychological counseling)  · Do not be alone:Call your Safe Contact- someone whom you trust who will be there for you.  · Call your local CRISIS HOTLINE 906-5859 or 590-541-3513  · Call your local Children's Mobile Crisis Response Team [place] (521) 515-0984 or www.GenSpera  · Call the toll free National Suicide Prevention Hotlines   · National Suicide Prevention Lifeline 763-385-QBRA (8598)  · National Hope Line Network 800-SUICIDE (025-1549)

## 2017-01-13 PROCEDURE — 99309 SBSQ NF CARE MODERATE MDM 30: CPT | Performed by: FAMILY MEDICINE

## 2017-01-16 ENCOUNTER — HOSPITAL ENCOUNTER (OUTPATIENT)
Facility: MEDICAL CENTER | Age: 82
End: 2017-01-16
Attending: FAMILY MEDICINE
Payer: COMMERCIAL

## 2017-01-16 LAB
ALBUMIN SERPL BCP-MCNC: 2.5 G/DL (ref 3.2–4.9)
BASOPHILS # BLD AUTO: 0.03 K/UL (ref 0–0.12)
BASOPHILS NFR BLD AUTO: 0.5 % (ref 0–1.8)
EOSINOPHIL # BLD: 0.06 K/UL (ref 0–0.51)
EOSINOPHIL NFR BLD AUTO: 1 % (ref 0–6.9)
ERYTHROCYTE [DISTWIDTH] IN BLOOD BY AUTOMATED COUNT: 48.9 FL (ref 35.9–50)
HCT VFR BLD AUTO: 39.5 % (ref 37–47)
HGB BLD-MCNC: 12.2 G/DL (ref 12–16)
IMM GRANULOCYTES # BLD AUTO: 0.12 K/UL (ref 0–0.11)
IMM GRANULOCYTES NFR BLD AUTO: 2.1 % (ref 0–0.9)
LYMPHOCYTES # BLD: 0.93 K/UL (ref 1–4.8)
LYMPHOCYTES NFR BLD AUTO: 16.2 % (ref 22–41)
MCH RBC QN AUTO: 31.1 PG (ref 27–33)
MCHC RBC AUTO-ENTMCNC: 30.9 G/DL (ref 33.6–35)
MCV RBC AUTO: 100.8 FL (ref 81.4–97.8)
MONOCYTES # BLD: 0.52 K/UL (ref 0–0.85)
MONOCYTES NFR BLD AUTO: 9.1 % (ref 0–13.4)
NEUTROPHILS # BLD: 4.07 K/UL (ref 2–7.15)
NEUTROPHILS NFR BLD AUTO: 71.1 % (ref 44–72)
NRBC # BLD AUTO: 0 K/UL
NRBC BLD-RTO: 0 /100 WBC
PLATELET # BLD AUTO: 281 K/UL (ref 164–446)
PMV BLD AUTO: 9.4 FL (ref 9–12.9)
RBC # BLD AUTO: 3.92 M/UL (ref 4.2–5.4)
WBC # BLD AUTO: 5.7 K/UL (ref 4.8–10.8)

## 2017-01-16 PROCEDURE — 99309 SBSQ NF CARE MODERATE MDM 30: CPT | Performed by: FAMILY MEDICINE

## 2017-01-18 PROCEDURE — 99309 SBSQ NF CARE MODERATE MDM 30: CPT | Performed by: INTERNAL MEDICINE

## 2017-01-19 PROCEDURE — 99309 SBSQ NF CARE MODERATE MDM 30: CPT | Performed by: INTERNAL MEDICINE

## 2017-01-20 ENCOUNTER — HOSPITAL ENCOUNTER (OUTPATIENT)
Facility: MEDICAL CENTER | Age: 82
End: 2017-01-20
Attending: FAMILY MEDICINE
Payer: MEDICARE

## 2017-01-20 LAB
ANION GAP SERPL CALC-SCNC: 4 MMOL/L (ref 0–11.9)
BUN SERPL-MCNC: 23 MG/DL (ref 8–22)
CALCIUM SERPL-MCNC: 8.6 MG/DL (ref 8.5–10.5)
CHLORIDE SERPL-SCNC: 102 MMOL/L (ref 96–112)
CO2 SERPL-SCNC: 34 MMOL/L (ref 20–33)
CREAT SERPL-MCNC: 0.55 MG/DL (ref 0.5–1.4)
GLUCOSE SERPL-MCNC: 92 MG/DL (ref 65–99)
POTASSIUM SERPL-SCNC: 4.7 MMOL/L (ref 3.6–5.5)
SODIUM SERPL-SCNC: 140 MMOL/L (ref 135–145)

## 2017-01-20 PROCEDURE — 36415 COLL VENOUS BLD VENIPUNCTURE: CPT

## 2017-01-20 PROCEDURE — 80048 BASIC METABOLIC PNL TOTAL CA: CPT

## 2017-01-21 PROCEDURE — 99309 SBSQ NF CARE MODERATE MDM 30: CPT | Performed by: INTERNAL MEDICINE

## 2017-01-24 PROCEDURE — 99309 SBSQ NF CARE MODERATE MDM 30: CPT | Performed by: FAMILY MEDICINE

## 2017-01-27 PROCEDURE — 99309 SBSQ NF CARE MODERATE MDM 30: CPT | Performed by: FAMILY MEDICINE

## 2017-01-30 PROCEDURE — 99309 SBSQ NF CARE MODERATE MDM 30: CPT | Performed by: FAMILY MEDICINE

## 2017-01-31 PROCEDURE — 99310 SBSQ NF CARE HIGH MDM 45: CPT | Performed by: INTERNAL MEDICINE

## 2017-02-01 ENCOUNTER — HOSPITAL ENCOUNTER (OUTPATIENT)
Facility: MEDICAL CENTER | Age: 82
End: 2017-02-01
Attending: FAMILY MEDICINE
Payer: MEDICARE

## 2017-02-01 LAB
ANION GAP SERPL CALC-SCNC: 6 MMOL/L (ref 0–11.9)
BASOPHILS # BLD AUTO: 0.02 K/UL (ref 0–0.12)
BASOPHILS NFR BLD AUTO: 0.3 % (ref 0–1.8)
BUN SERPL-MCNC: 18 MG/DL (ref 8–22)
CALCIUM SERPL-MCNC: 9 MG/DL (ref 8.5–10.5)
CHLORIDE SERPL-SCNC: 99 MMOL/L (ref 96–112)
CO2 SERPL-SCNC: 33 MMOL/L (ref 20–33)
CREAT SERPL-MCNC: 0.49 MG/DL (ref 0.5–1.4)
EOSINOPHIL # BLD: 0.22 K/UL (ref 0–0.51)
EOSINOPHIL NFR BLD AUTO: 3.5 % (ref 0–6.9)
ERYTHROCYTE [DISTWIDTH] IN BLOOD BY AUTOMATED COUNT: 56.2 FL (ref 35.9–50)
GLUCOSE SERPL-MCNC: 74 MG/DL (ref 65–99)
HCT VFR BLD AUTO: 37.3 % (ref 37–47)
HGB BLD-MCNC: 11.5 G/DL (ref 12–16)
IMM GRANULOCYTES # BLD AUTO: 0.04 K/UL (ref 0–0.11)
IMM GRANULOCYTES NFR BLD AUTO: 0.6 % (ref 0–0.9)
LYMPHOCYTES # BLD: 1.32 K/UL (ref 1–4.8)
LYMPHOCYTES NFR BLD AUTO: 20.9 % (ref 22–41)
MCH RBC QN AUTO: 30.8 PG (ref 27–33)
MCHC RBC AUTO-ENTMCNC: 30.8 G/DL (ref 33.6–35)
MCV RBC AUTO: 100 FL (ref 81.4–97.8)
MONOCYTES # BLD: 0.64 K/UL (ref 0–0.85)
MONOCYTES NFR BLD AUTO: 10.1 % (ref 0–13.4)
NEUTROPHILS # BLD: 4.09 K/UL (ref 2–7.15)
NEUTROPHILS NFR BLD AUTO: 64.6 % (ref 44–72)
NRBC # BLD AUTO: 0 K/UL
NRBC BLD-RTO: 0 /100 WBC
PLATELET # BLD AUTO: 220 K/UL (ref 164–446)
PMV BLD AUTO: 10.1 FL (ref 9–12.9)
POTASSIUM SERPL-SCNC: 4 MMOL/L (ref 3.6–5.5)
RBC # BLD AUTO: 3.73 M/UL (ref 4.2–5.4)
SODIUM SERPL-SCNC: 138 MMOL/L (ref 135–145)
WBC # BLD AUTO: 6.3 K/UL (ref 4.8–10.8)

## 2017-02-01 PROCEDURE — 80048 BASIC METABOLIC PNL TOTAL CA: CPT

## 2017-02-01 PROCEDURE — 99309 SBSQ NF CARE MODERATE MDM 30: CPT | Performed by: INTERNAL MEDICINE

## 2017-02-01 PROCEDURE — 36415 COLL VENOUS BLD VENIPUNCTURE: CPT

## 2017-02-01 PROCEDURE — 85025 COMPLETE CBC W/AUTO DIFF WBC: CPT

## 2017-02-02 PROCEDURE — 99309 SBSQ NF CARE MODERATE MDM 30: CPT | Performed by: INTERNAL MEDICINE

## 2017-02-03 PROCEDURE — 99309 SBSQ NF CARE MODERATE MDM 30: CPT | Performed by: INTERNAL MEDICINE

## 2017-02-04 PROCEDURE — 99309 SBSQ NF CARE MODERATE MDM 30: CPT | Performed by: INTERNAL MEDICINE

## 2017-02-06 PROCEDURE — 99309 SBSQ NF CARE MODERATE MDM 30: CPT | Performed by: INTERNAL MEDICINE

## 2017-02-10 PROCEDURE — 99309 SBSQ NF CARE MODERATE MDM 30: CPT | Performed by: FAMILY MEDICINE

## 2017-02-14 PROCEDURE — 99309 SBSQ NF CARE MODERATE MDM 30: CPT | Performed by: INTERNAL MEDICINE

## 2017-02-17 PROCEDURE — 99309 SBSQ NF CARE MODERATE MDM 30: CPT | Performed by: INTERNAL MEDICINE

## 2017-02-18 PROCEDURE — 99309 SBSQ NF CARE MODERATE MDM 30: CPT | Performed by: INTERNAL MEDICINE

## 2017-02-20 PROCEDURE — 99309 SBSQ NF CARE MODERATE MDM 30: CPT | Performed by: INTERNAL MEDICINE

## 2017-02-21 PROCEDURE — 99309 SBSQ NF CARE MODERATE MDM 30: CPT | Performed by: FAMILY MEDICINE

## 2017-02-23 PROCEDURE — 99309 SBSQ NF CARE MODERATE MDM 30: CPT | Performed by: FAMILY MEDICINE

## 2017-02-24 ENCOUNTER — HOSPITAL ENCOUNTER (OUTPATIENT)
Facility: MEDICAL CENTER | Age: 82
End: 2017-02-24
Attending: FAMILY MEDICINE
Payer: MEDICARE

## 2017-02-24 LAB
ANION GAP SERPL CALC-SCNC: 4 MMOL/L (ref 0–11.9)
BASOPHILS # BLD AUTO: 0.03 K/UL (ref 0–0.12)
BASOPHILS NFR BLD AUTO: 0.5 % (ref 0–1.8)
BUN SERPL-MCNC: 28 MG/DL (ref 8–22)
CALCIUM SERPL-MCNC: 8.6 MG/DL (ref 8.5–10.5)
CHLORIDE SERPL-SCNC: 100 MMOL/L (ref 96–112)
CO2 SERPL-SCNC: 40 MMOL/L (ref 20–33)
CREAT SERPL-MCNC: 0.46 MG/DL (ref 0.5–1.4)
EOSINOPHIL # BLD: 0.24 K/UL (ref 0–0.51)
EOSINOPHIL NFR BLD AUTO: 4.1 % (ref 0–6.9)
ERYTHROCYTE [DISTWIDTH] IN BLOOD BY AUTOMATED COUNT: 58 FL (ref 35.9–50)
GLUCOSE SERPL-MCNC: 74 MG/DL (ref 65–99)
HCT VFR BLD AUTO: 34 % (ref 37–47)
HGB BLD-MCNC: 10.2 G/DL (ref 12–16)
IMM GRANULOCYTES # BLD AUTO: 0.01 K/UL (ref 0–0.11)
IMM GRANULOCYTES NFR BLD AUTO: 0.2 % (ref 0–0.9)
LYMPHOCYTES # BLD: 1.19 K/UL (ref 1–4.8)
LYMPHOCYTES NFR BLD AUTO: 20.3 % (ref 22–41)
MCH RBC QN AUTO: 31.3 PG (ref 27–33)
MCHC RBC AUTO-ENTMCNC: 30 G/DL (ref 33.6–35)
MCV RBC AUTO: 104.3 FL (ref 81.4–97.8)
MONOCYTES # BLD: 0.59 K/UL (ref 0–0.85)
MONOCYTES NFR BLD AUTO: 10.1 % (ref 0–13.4)
NEUTROPHILS # BLD: 3.8 K/UL (ref 2–7.15)
NEUTROPHILS NFR BLD AUTO: 64.8 % (ref 44–72)
NRBC # BLD AUTO: 0 K/UL
NRBC BLD-RTO: 0 /100 WBC
PLATELET # BLD AUTO: 249 K/UL (ref 164–446)
PMV BLD AUTO: 10.6 FL (ref 9–12.9)
POTASSIUM SERPL-SCNC: 4.4 MMOL/L (ref 3.6–5.5)
RBC # BLD AUTO: 3.26 M/UL (ref 4.2–5.4)
SODIUM SERPL-SCNC: 144 MMOL/L (ref 135–145)
WBC # BLD AUTO: 5.9 K/UL (ref 4.8–10.8)

## 2017-02-24 PROCEDURE — 85025 COMPLETE CBC W/AUTO DIFF WBC: CPT

## 2017-02-24 PROCEDURE — 80048 BASIC METABOLIC PNL TOTAL CA: CPT

## 2017-05-10 ENCOUNTER — HOSPITAL ENCOUNTER (INPATIENT)
Facility: MEDICAL CENTER | Age: 82
LOS: 3 days | DRG: 193 | End: 2017-05-13
Attending: EMERGENCY MEDICINE | Admitting: INTERNAL MEDICINE
Payer: MEDICARE

## 2017-05-10 ENCOUNTER — APPOINTMENT (OUTPATIENT)
Dept: RADIOLOGY | Facility: MEDICAL CENTER | Age: 82
DRG: 193 | End: 2017-05-10
Attending: INTERNAL MEDICINE
Payer: MEDICARE

## 2017-05-10 ENCOUNTER — APPOINTMENT (OUTPATIENT)
Dept: RADIOLOGY | Facility: MEDICAL CENTER | Age: 82
DRG: 193 | End: 2017-05-10
Attending: EMERGENCY MEDICINE
Payer: MEDICARE

## 2017-05-10 ENCOUNTER — RESOLUTE PROFESSIONAL BILLING HOSPITAL PROF FEE (OUTPATIENT)
Dept: HOSPITALIST | Facility: MEDICAL CENTER | Age: 82
End: 2017-05-10
Payer: MEDICARE

## 2017-05-10 DIAGNOSIS — R06.89 RESPIRATORY INSUFFICIENCY: ICD-10-CM

## 2017-05-10 PROBLEM — J18.9 CAP (COMMUNITY ACQUIRED PNEUMONIA): Status: ACTIVE | Noted: 2017-05-10

## 2017-05-10 PROBLEM — J96.21 ACUTE ON CHRONIC RESPIRATORY FAILURE WITH HYPOXEMIA (HCC): Status: ACTIVE | Noted: 2017-05-10

## 2017-05-10 LAB
ALBUMIN SERPL BCP-MCNC: 2.4 G/DL (ref 3.2–4.9)
ALBUMIN/GLOB SERPL: 0.6 G/DL
ALP SERPL-CCNC: 62 U/L (ref 30–99)
ALT SERPL-CCNC: 12 U/L (ref 2–50)
ANION GAP SERPL CALC-SCNC: 9 MMOL/L (ref 0–11.9)
APTT PPP: 30 SEC (ref 24.7–36)
AST SERPL-CCNC: 28 U/L (ref 12–45)
BASOPHILS # BLD AUTO: 0.5 % (ref 0–1.8)
BASOPHILS # BLD: 0.05 K/UL (ref 0–0.12)
BILIRUB SERPL-MCNC: 0.8 MG/DL (ref 0.1–1.5)
BNP SERPL-MCNC: 87 PG/ML (ref 0–100)
BUN SERPL-MCNC: 14 MG/DL (ref 8–22)
CALCIUM SERPL-MCNC: 9.4 MG/DL (ref 8.4–10.2)
CHLORIDE SERPL-SCNC: 97 MMOL/L (ref 96–112)
CO2 SERPL-SCNC: 32 MMOL/L (ref 20–33)
CREAT SERPL-MCNC: 0.57 MG/DL (ref 0.5–1.4)
DEPRECATED D DIMER PPP IA-ACNC: 1766 NG/ML(D-DU)
EOSINOPHIL # BLD AUTO: 0.19 K/UL (ref 0–0.51)
EOSINOPHIL NFR BLD: 1.7 % (ref 0–6.9)
ERYTHROCYTE [DISTWIDTH] IN BLOOD BY AUTOMATED COUNT: 49.1 FL (ref 35.9–50)
GFR SERPL CREATININE-BSD FRML MDRD: >60 ML/MIN/1.73 M 2
GLOBULIN SER CALC-MCNC: 4.3 G/DL (ref 1.9–3.5)
GLUCOSE SERPL-MCNC: 135 MG/DL (ref 65–99)
HCT VFR BLD AUTO: 33.3 % (ref 37–47)
HGB BLD-MCNC: 10.7 G/DL (ref 12–16)
IMM GRANULOCYTES # BLD AUTO: 0.04 K/UL (ref 0–0.11)
IMM GRANULOCYTES NFR BLD AUTO: 0.4 % (ref 0–0.9)
INR PPP: 1.08 (ref 0.87–1.13)
LACTATE BLD-SCNC: 1.68 MMOL/L (ref 0.5–2)
LACTATE BLD-SCNC: 2.1 MMOL/L (ref 0.5–2)
LYMPHOCYTES # BLD AUTO: 3.73 K/UL (ref 1–4.8)
LYMPHOCYTES NFR BLD: 33.9 % (ref 22–41)
MAGNESIUM SERPL-MCNC: 1.7 MG/DL (ref 1.5–2.5)
MCH RBC QN AUTO: 30.1 PG (ref 27–33)
MCHC RBC AUTO-ENTMCNC: 32.1 G/DL (ref 33.6–35)
MCV RBC AUTO: 93.8 FL (ref 81.4–97.8)
MONOCYTES # BLD AUTO: 0.67 K/UL (ref 0–0.85)
MONOCYTES NFR BLD AUTO: 6.1 % (ref 0–13.4)
NEUTROPHILS # BLD AUTO: 6.33 K/UL (ref 2–7.15)
NEUTROPHILS NFR BLD: 57.4 % (ref 44–72)
NRBC # BLD AUTO: 0 K/UL
NRBC BLD AUTO-RTO: 0 /100 WBC
PLATELET # BLD AUTO: 288 K/UL (ref 164–446)
PMV BLD AUTO: 9.6 FL (ref 9–12.9)
POTASSIUM SERPL-SCNC: 3.8 MMOL/L (ref 3.6–5.5)
PROT SERPL-MCNC: 6.7 G/DL (ref 6–8.2)
PROTHROMBIN TIME: 13.8 SEC (ref 12–14.6)
RBC # BLD AUTO: 3.55 M/UL (ref 4.2–5.4)
SODIUM SERPL-SCNC: 138 MMOL/L (ref 135–145)
SPECIMEN DRAWN FROM PATIENT: ABNORMAL
SPECIMEN DRAWN FROM PATIENT: NORMAL
TROPONIN I SERPL-MCNC: <0.02 NG/ML (ref 0–0.04)
TSH SERPL DL<=0.005 MIU/L-ACNC: 2.53 UIU/ML (ref 0.35–5.5)
WBC # BLD AUTO: 11 K/UL (ref 4.8–10.8)

## 2017-05-10 PROCEDURE — 83880 ASSAY OF NATRIURETIC PEPTIDE: CPT

## 2017-05-10 PROCEDURE — 83605 ASSAY OF LACTIC ACID: CPT

## 2017-05-10 PROCEDURE — 71010 DX-CHEST-PORTABLE (1 VIEW): CPT

## 2017-05-10 PROCEDURE — 94760 N-INVAS EAR/PLS OXIMETRY 1: CPT

## 2017-05-10 PROCEDURE — 99223 1ST HOSP IP/OBS HIGH 75: CPT | Mod: AI | Performed by: INTERNAL MEDICINE

## 2017-05-10 PROCEDURE — 700111 HCHG RX REV CODE 636 W/ 250 OVERRIDE (IP): Performed by: EMERGENCY MEDICINE

## 2017-05-10 PROCEDURE — 87040 BLOOD CULTURE FOR BACTERIA: CPT

## 2017-05-10 PROCEDURE — 93306 TTE W/DOPPLER COMPLETE: CPT | Mod: 26 | Performed by: INTERNAL MEDICINE

## 2017-05-10 PROCEDURE — 93005 ELECTROCARDIOGRAM TRACING: CPT

## 2017-05-10 PROCEDURE — 71275 CT ANGIOGRAPHY CHEST: CPT

## 2017-05-10 PROCEDURE — 87077 CULTURE AEROBIC IDENTIFY: CPT

## 2017-05-10 PROCEDURE — 93005 ELECTROCARDIOGRAM TRACING: CPT | Performed by: EMERGENCY MEDICINE

## 2017-05-10 PROCEDURE — 770020 HCHG ROOM/CARE - TELE (206)

## 2017-05-10 PROCEDURE — 96365 THER/PROPH/DIAG IV INF INIT: CPT

## 2017-05-10 PROCEDURE — 700111 HCHG RX REV CODE 636 W/ 250 OVERRIDE (IP)

## 2017-05-10 PROCEDURE — 85379 FIBRIN DEGRADATION QUANT: CPT

## 2017-05-10 PROCEDURE — 85730 THROMBOPLASTIN TIME PARTIAL: CPT

## 2017-05-10 PROCEDURE — 84484 ASSAY OF TROPONIN QUANT: CPT

## 2017-05-10 PROCEDURE — 80053 COMPREHEN METABOLIC PANEL: CPT

## 2017-05-10 PROCEDURE — 85025 COMPLETE CBC W/AUTO DIFF WBC: CPT

## 2017-05-10 PROCEDURE — 304561 HCHG STAT O2

## 2017-05-10 PROCEDURE — 700105 HCHG RX REV CODE 258: Performed by: EMERGENCY MEDICINE

## 2017-05-10 PROCEDURE — 304562 HCHG STAT O2 MASK/CANNULA

## 2017-05-10 PROCEDURE — 700101 HCHG RX REV CODE 250: Performed by: EMERGENCY MEDICINE

## 2017-05-10 PROCEDURE — 84443 ASSAY THYROID STIM HORMONE: CPT

## 2017-05-10 PROCEDURE — 94640 AIRWAY INHALATION TREATMENT: CPT

## 2017-05-10 PROCEDURE — 83735 ASSAY OF MAGNESIUM: CPT

## 2017-05-10 PROCEDURE — 99285 EMERGENCY DEPT VISIT HI MDM: CPT

## 2017-05-10 PROCEDURE — 86713 LEGIONELLA ANTIBODY: CPT

## 2017-05-10 PROCEDURE — 85610 PROTHROMBIN TIME: CPT

## 2017-05-10 PROCEDURE — 700105 HCHG RX REV CODE 258

## 2017-05-10 PROCEDURE — 96367 TX/PROPH/DG ADDL SEQ IV INF: CPT

## 2017-05-10 PROCEDURE — 96375 TX/PRO/DX INJ NEW DRUG ADDON: CPT

## 2017-05-10 PROCEDURE — 36415 COLL VENOUS BLD VENIPUNCTURE: CPT

## 2017-05-10 PROCEDURE — 96361 HYDRATE IV INFUSION ADD-ON: CPT

## 2017-05-10 PROCEDURE — 700111 HCHG RX REV CODE 636 W/ 250 OVERRIDE (IP): Performed by: INTERNAL MEDICINE

## 2017-05-10 RX ORDER — MEGESTROL ACETATE 40 MG/ML
800 SUSPENSION ORAL DAILY
Status: DISCONTINUED | OUTPATIENT
Start: 2017-05-11 | End: 2017-05-13 | Stop reason: HOSPADM

## 2017-05-10 RX ORDER — SODIUM CHLORIDE 9 MG/ML
INJECTION, SOLUTION INTRAVENOUS CONTINUOUS
Status: DISCONTINUED | OUTPATIENT
Start: 2017-05-10 | End: 2017-05-12

## 2017-05-10 RX ORDER — METHYLPREDNISOLONE SODIUM SUCCINATE 40 MG/ML
INJECTION, POWDER, LYOPHILIZED, FOR SOLUTION INTRAMUSCULAR; INTRAVENOUS
Status: DISPENSED
Start: 2017-05-10 | End: 2017-05-11

## 2017-05-10 RX ORDER — HEPARIN SODIUM 5000 [USP'U]/ML
5000 INJECTION, SOLUTION INTRAVENOUS; SUBCUTANEOUS EVERY 8 HOURS
Status: DISCONTINUED | OUTPATIENT
Start: 2017-05-10 | End: 2017-05-13 | Stop reason: HOSPADM

## 2017-05-10 RX ORDER — OXYBUTYNIN CHLORIDE 5 MG/1
5 TABLET ORAL 2 TIMES DAILY
Status: DISCONTINUED | OUTPATIENT
Start: 2017-05-11 | End: 2017-05-13 | Stop reason: HOSPADM

## 2017-05-10 RX ORDER — BISACODYL 10 MG
10 SUPPOSITORY, RECTAL RECTAL
Status: DISCONTINUED | OUTPATIENT
Start: 2017-05-10 | End: 2017-05-13 | Stop reason: HOSPADM

## 2017-05-10 RX ORDER — ACETAMINOPHEN 325 MG/1
650 TABLET ORAL EVERY 6 HOURS PRN
Status: DISCONTINUED | OUTPATIENT
Start: 2017-05-10 | End: 2017-05-13 | Stop reason: HOSPADM

## 2017-05-10 RX ORDER — SODIUM CHLORIDE 9 MG/ML
500 INJECTION, SOLUTION INTRAVENOUS
Status: DISCONTINUED | OUTPATIENT
Start: 2017-05-10 | End: 2017-05-13 | Stop reason: HOSPADM

## 2017-05-10 RX ORDER — PREDNISONE 20 MG/1
40 TABLET ORAL DAILY
Status: DISCONTINUED | OUTPATIENT
Start: 2017-05-11 | End: 2017-05-12

## 2017-05-10 RX ORDER — ONDANSETRON 4 MG/1
4 TABLET, ORALLY DISINTEGRATING ORAL EVERY 4 HOURS PRN
Status: DISCONTINUED | OUTPATIENT
Start: 2017-05-10 | End: 2017-05-13 | Stop reason: HOSPADM

## 2017-05-10 RX ORDER — HYDROCODONE BITARTRATE AND ACETAMINOPHEN 5; 325 MG/1; MG/1
1-2 TABLET ORAL EVERY 4 HOURS PRN
Status: DISCONTINUED | OUTPATIENT
Start: 2017-05-10 | End: 2017-05-13 | Stop reason: HOSPADM

## 2017-05-10 RX ORDER — CEFTRIAXONE 2 G/1
INJECTION, POWDER, FOR SOLUTION INTRAMUSCULAR; INTRAVENOUS
Status: COMPLETED
Start: 2017-05-10 | End: 2017-05-10

## 2017-05-10 RX ORDER — AMOXICILLIN 250 MG
2 CAPSULE ORAL 2 TIMES DAILY
Status: DISCONTINUED | OUTPATIENT
Start: 2017-05-10 | End: 2017-05-13 | Stop reason: HOSPADM

## 2017-05-10 RX ORDER — ALBUTEROL SULFATE 90 UG/1
2 AEROSOL, METERED RESPIRATORY (INHALATION) EVERY 4 HOURS PRN
Status: DISCONTINUED | OUTPATIENT
Start: 2017-05-10 | End: 2017-05-13 | Stop reason: HOSPADM

## 2017-05-10 RX ORDER — METHYLPREDNISOLONE SODIUM SUCCINATE 40 MG/ML
80 INJECTION, POWDER, LYOPHILIZED, FOR SOLUTION INTRAMUSCULAR; INTRAVENOUS ONCE
Status: COMPLETED | OUTPATIENT
Start: 2017-05-10 | End: 2017-05-10

## 2017-05-10 RX ORDER — SODIUM CHLORIDE 9 MG/ML
1000 INJECTION, SOLUTION INTRAVENOUS ONCE
Status: COMPLETED | OUTPATIENT
Start: 2017-05-10 | End: 2017-05-10

## 2017-05-10 RX ORDER — ALBUMIN (HUMAN) 12.5 G/50ML
25 SOLUTION INTRAVENOUS ONCE
Status: DISCONTINUED | OUTPATIENT
Start: 2017-05-10 | End: 2017-05-10

## 2017-05-10 RX ORDER — POLYETHYLENE GLYCOL 3350 17 G/17G
1 POWDER, FOR SOLUTION ORAL
Status: DISCONTINUED | OUTPATIENT
Start: 2017-05-10 | End: 2017-05-13 | Stop reason: HOSPADM

## 2017-05-10 RX ORDER — IPRATROPIUM BROMIDE AND ALBUTEROL SULFATE 2.5; .5 MG/3ML; MG/3ML
3 SOLUTION RESPIRATORY (INHALATION)
Status: DISCONTINUED | OUTPATIENT
Start: 2017-05-10 | End: 2017-05-13 | Stop reason: HOSPADM

## 2017-05-10 RX ORDER — TOLTERODINE TARTRATE 2 MG/1
2 TABLET, EXTENDED RELEASE ORAL 2 TIMES DAILY
Status: DISCONTINUED | OUTPATIENT
Start: 2017-05-10 | End: 2017-05-10

## 2017-05-10 RX ADMIN — ALBUTEROL SULFATE 2.5 MG: 2.5 SOLUTION RESPIRATORY (INHALATION) at 15:51

## 2017-05-10 RX ADMIN — AZITHROMYCIN MONOHYDRATE 500 MG: 500 INJECTION, POWDER, LYOPHILIZED, FOR SOLUTION INTRAVENOUS at 19:40

## 2017-05-10 RX ADMIN — IPRATROPIUM BROMIDE 0.5 MG: 0.5 SOLUTION RESPIRATORY (INHALATION) at 15:51

## 2017-05-10 RX ADMIN — METHYLPREDNISOLONE SODIUM SUCCINATE 80 MG: 40 INJECTION, POWDER, FOR SOLUTION INTRAMUSCULAR; INTRAVENOUS at 17:28

## 2017-05-10 RX ADMIN — CEFTRIAXONE 2 G: 2 INJECTION, POWDER, FOR SOLUTION INTRAMUSCULAR; INTRAVENOUS at 18:48

## 2017-05-10 RX ADMIN — HEPARIN SODIUM 5000 UNITS: 5000 INJECTION, SOLUTION INTRAVENOUS; SUBCUTANEOUS at 22:53

## 2017-05-10 RX ADMIN — SODIUM CHLORIDE: 9 INJECTION, SOLUTION INTRAVENOUS at 15:30

## 2017-05-10 RX ADMIN — SODIUM CHLORIDE 1000 ML: 9 INJECTION, SOLUTION INTRAVENOUS at 17:26

## 2017-05-10 RX ADMIN — SODIUM CHLORIDE: 9 INJECTION, SOLUTION INTRAVENOUS at 22:57

## 2017-05-10 ASSESSMENT — LIFESTYLE VARIABLES
DO YOU DRINK ALCOHOL: NO
EVER_SMOKED: NEVER

## 2017-05-10 ASSESSMENT — COPD QUESTIONNAIRES
HAVE YOU SMOKED AT LEAST 100 CIGARETTES IN YOUR ENTIRE LIFE: NO/DON'T KNOW
COPD SCREENING SCORE: 3
DURING THE PAST 4 WEEKS HOW MUCH DID YOU FEEL SHORT OF BREATH: NONE/LITTLE OF THE TIME
DO YOU EVER COUGH UP ANY MUCUS OR PHLEGM?: NO/ONLY WITH OCCASIONAL COLDS OR INFECTIONS

## 2017-05-10 ASSESSMENT — PAIN SCALES - GENERAL: PAINLEVEL_OUTOF10: 0

## 2017-05-10 NOTE — IP AVS SNAPSHOT
5/13/2017    Rosey Walker  [address]  [place] [address]    Dear Rosey:    [facility] wants to ensure your discharge home is safe and you or your loved ones have had all of your questions answered regarding your care after you leave the hospital.    Below is a list of resources and contact information should you have any questions regarding your hospital stay, follow-up instructions, or active medical symptoms.    Questions or Concerns Regarding… Contact   Medical Questions Related to Your Discharge  (7 days a week, 8am-5pm) Contact a Nurse Care Coordinator   [phone]   Medical Questions Not Related to Your Discharge  (24 hours a day / 7 days a week)  Contact the Nurse Health Line   [phone]    Medications or Discharge Instructions Refer to your discharge packet   or contact your [facility] Primary Care Provider   [phone]   Follow-up Appointment(s) Schedule your appointment via [employer]   or contact Scheduling [phone]   Billing Review your statement via [employer]  or contact Billing [phone]   Medical Records Review your records via Win[employer]   or contact Medical Records [phone]     You may receive a telephone call within two days of discharge. This call is to make certain you understand your discharge instructions and have the opportunity to have any questions answered. You can also easily access your medical information, test results and upcoming appointments via the Win[employer]be free online health management tool. You can learn more and sign up at [email or web address]. For assistance setting up your Win[employer] account, please call [phone].    Once again, we want to ensure your discharge home is safe and that you have a clear understanding of any next steps in your care. If you have any questions or concerns, please do not hesitate to contact us, we are here for you. Thank you for choosing [facility] for your healthcare needs.    Sincerely,    Your [facility] Healthcare Team

## 2017-05-10 NOTE — IP AVS SNAPSHOT
" Home Care Instructions                                                                                                                  Name:Rosey Walker  Medical Record Number:[id number]  CSN: [id number]    YOB: 1926   Age: 89 y.o.  Sex: female  HT:1.549 m (5' 0.98\") WT: 48 kg (105 lb 13.1 oz)          Admit Date: 5/10/2017     Discharge Date:   Today's Date: 5/13/2017  Attending Doctor:  Reinier Gleason*                  Allergies:  Anectine            Discharge Instructions       Discharge Instructions    Discharged to home by car with [facility] transport. Discharged via wheelchair, hospital escort: Yes.  Special equipment needed: Not Applicable    Be sure to schedule a follow-up appointment with your primary care doctor or any specialists as instructed.     Discharge Plan:   Influenza Vaccine Indication: Patient Refuses    I understand that a diet low in cholesterol, fat, and sodium is recommended for good health. Unless I have been given specific instructions below for another diet, I accept this instruction as my diet prescription.   Other diet: a low fat and sodium    Special Instructions: Pneumonia, Adult  Pneumonia is an infection of the lungs.   CAUSES  Pneumonia may be caused by bacteria or a virus. Usually, the infection is caused by breathing in droplets from an infected person's cough or sneeze.   SYMPTOMS   Symptoms of pneumonia include:  · Cough.  · Fever.  · Chest pain.  · Rapid breathing.  · Shortness of breath.  · Shaking chills.  · Mucus production.  DIAGNOSIS   If you have the common symptoms of pneumonia, often your health care provider will confirm the diagnosis with a chest X-ray. The X-ray will show an abnormality in the lung if you have pneumonia. Other tests may be done on your blood, urine, or mucus (sputum) to find the specific cause of your pneumonia. A blood gas test or pulse oximetry test may be needed to check how well your lungs are working.  TREATMENT   Your " treatment will depend on whether your pneumonia is caused by bacteria or a virus.   · Bacterial pneumonia is treated with antibiotic medicine.  · Pneumonia that is caused by the influenza virus may be treated with an antiviral medicine.  · Pneumonia that is caused by a virus other than influenza will not respond to antibiotic medicine. This type of pneumonia will have to run its course.   HOME CARE INSTRUCTIONS   · Cough suppressants may be used if you are losing too much rest from coughing at night. However, you should try to avoid taking cough suppresants. This is because coughing helps to remove mucus from your lungs.  · Sleep in a semi-upright position at night. Try sleeping in a reclining chair, or place a few pillows under your head.  · Try using a cold steam vaporizer or humidifier in your home or bedroom. This may help loosen your mucus.  · If you were prescribed an antibiotic medicine, finish all of it even if you start to feel better.  · If you were prescribed an expectorant, take it as directed by your health care provider. This medicine loosens the mucus so you can cough it up.  · Take medicines only as directed by your health care provider.  · Do not smoke. If you are a smoker and continue to smoke, your cough may last several weeks after your pneumonia has cleared.  · Get rest when you feel tired, or as needed.  PREVENTION  A pneumococcal shot (vaccine) is available to prevent a common bacterial cause of pneumonia. This is usually suggested for:  · People over 65 years old.  · People on chemotherapy.  · People with chronic lung problems, such as bronchitis or emphysema.  · People with immune system problems.  If you are over 65 years old or have a high risk condition, you may receive the pneumococcal vaccine if you have not received it before. In some countries, a routine influenza vaccine is also recommended. This vaccine can help prevent some cases of pneumonia. You may be offered the influenza  vaccine as part of your care.  If you are a smoker, it is time to quit in order to prevent pneumonia in the future. You may receive instructions on how to stop smoking. Your health care provider can provide medicines and counseling to help you quit.  SEEK MEDICAL CARE IF:  · You have a fever.  · You cannot control your cough with suppressants at night, and you keep losing sleep.  SEEK IMMEDIATE MEDICAL CARE IF:   · You have worsening shortness of breath.  · You have increased chest pain.  · Your sickness becomes worse, especially if you are an older adult or have a weakened immune system.  · You cough up blood.  · You have pain that is getting worse or is not controlled with medicines.  · Your symptoms are getting worse rather than better.     This information is not intended to replace advice given to you by your health care provider. Make sure you discuss any questions you have with your health care provider.     Document Released: 12/18/2006 Document Revised: 01/08/2016 Document Reviewed: 04/13/2016  [employer] Interactive Patient Education ©2016 [employer]      · Is patient discharged on Warfarin / Coumadin?   No     · Is patient Post Blood Transfusion?  No    Depression / Suicide Risk    As you are discharged from this [facility] facility, it is important to learn how to keep safe from harming yourself.    Recognize the warning signs:  · Abrupt changes in personality, positive or negative- including increase in energy   · Giving away possessions  · Change in eating patterns- significant weight changes-  positive or negative  · Change in sleeping patterns- unable to sleep or sleeping all the time   · Unwillingness or inability to communicate  · Depression  · Unusual sadness, discouragement and loneliness  · Talk of wanting to die  · Neglect of personal appearance   · Rebelliousness- reckless behavior  · Withdrawal from people/activities they love  · Confusion- inability to concentrate     If you or a loved  one observes any of these behaviors or has concerns about self-harm, here's what you can do:  · Talk about it- your feelings and reasons for harming yourself  · Remove any means that you might use to hurt yourself (examples: pills, rope, extension cords, firearm)  · Get professional help from the community (Mental Health, Substance Abuse, psychological counseling)  · Do not be alone:Call your Safe Contact- someone whom you trust who will be there for you.  · Call your local CRISIS HOTLINE 628-3570 or 844-317-3547  · Call your local Children's Mobile Crisis Response Team [place] (915) 283-6963 or www.[employer]  · Call the toll free National Suicide Prevention Hotlines   · National Suicide Prevention Lifeline 548-732-KZZF (7344)  · National Hope Line Network 800-SUICIDE (112-8566)        Your appointments     May 18, 2017 10:00 AM   New Patient with Gege Carrasco M.D.   [address] ([facility])    [address] [place] [id number] [phone]           Please bring Photo ID, Insurance Cards, All Medication Bottles and copies of any legal documents (such as Living Will, Power of ) If speaking a language besides English please bring an adult . Please arrive 30 minutes prior for check in and registration. You will be receiving a confirmation call a few days before your appointment from our automated call confirmation system.                 Discharge Medication Instructions:    Below are the medications your physician expects you to take upon discharge:    Review all your home medications and newly ordered medications with your doctor and/or pharmacist. Follow medication instructions as directed by your doctor and/or pharmacist.    Please keep your medication list with you and share with your physician.               Medication List      START taking these medications        Instructions    Morning Afternoon Evening Bedtime    azithromycin 250 MG Tabs   Last time  this was given:  250 mg on 5/13/2017 12:43 PM   Commonly known as:  ZITHROMAX        Take 1 Tab by mouth every day. One pill daily starting 5/14   Dose:  250 mg                        levofloxacin 250 MG Tabs   Commonly known as:  LEVAQUIN        Take 1 Tab by mouth every day.   Dose:  250 mg                          CONTINUE taking these medications        Instructions    Morning Afternoon Evening Bedtime    acetaminophen 325 MG Tabs   Last time this was given:  650 mg on 5/12/2017  8:08 PM   Commonly known as:  TYLENOL        Take 2 Tabs by mouth every 6 hours as needed (Mild Pain; (Pain scale 1-3); Temp greater than 100.5 F).   Dose:  650 mg                        COMBIVENT RESPIMAT  MCG/ACT Aers   Generic drug:  ipratropium-albuterol        Inhale 1 Puff by mouth 4 times a day as needed (SOB, COUGH, WHEEZE.  DO NOT EXCEED 6 PUFFS IN 24 HRS.).   Dose:  1 Puff                        enoxaparin 30 MG/0.3ML Soln inj   Commonly known as:  LOVENOX        Inject 0.3 mL as instructed every day.   Dose:  30 mg                        FOLIC ACID PO        Take  by mouth every day.                        megestrol 40 MG/ML Susp   Last time this was given:  800 mg on 5/13/2017  9:01 AM   Commonly known as:  MEGACE        Take 20 mL by mouth every day.   Dose:  800 mg                        methotrexate 2.5 MG tablet   Commonly known as:  RHEUMATREX        Take 15 mg by mouth every 7 days.   Dose:  15 mg                        NORCO 5-325 MG Tabs per tablet   Generic drug:  hydrocodone-acetaminophen        Take 1-2 Tabs by mouth every four hours as needed.   Dose:  1-2 Tab                        ondansetron 4 MG Tbdp   Commonly known as:  ZOFRAN ODT        Take 1 Tab by mouth every four hours as needed for Nausea/Vomiting (give PO if IV route is unavailable. May give per feeding tube.).   Dose:  4 mg                        predniSONE 10 MG Tabs   Last time this was given:  20 mg on 5/13/2017  9:02 AM   Commonly known  as:  DELTASONE        Take 0.5 Tabs by mouth every day.   Dose:  7 mg                        tolterodine 2 MG Tabs   Commonly known as:  DETROL        Take 1 Tab by mouth 2 Times a Day.   Dose:  2 mg                        VENTOLIN  (90 BASE) MCG/ACT Aers inhalation aerosol   Generic drug:  albuterol        Inhale 2 Puffs by mouth every four hours as needed for Shortness of Breath.   Dose:  2 Puff                             Where to Get Your Medications      [facility] [facility]. - [place], [place] - [address] [facility]  [address] [facility] [address] [facility] [address], [facility] [address]     Phone:  [phone]    - azithromycin 250 MG Tabs      You can get these medications from any pharmacy     Bring a paper prescription for each of these medications    - levofloxacin 250 MG Tabs            Instructions           Diet / Nutrition:    Follow any diet instructions given to you by your doctor or the dietician, including how much salt (sodium) you are allowed each day.    If you are overweight, talk to your doctor about a weight reduction plan.    Activity:    Remain physically active following your doctor's instructions about exercise and activity.    Rest often.     Any time you become even a little tired or short of breath, SIT DOWN and rest.    Worsening Symptoms:    Report any of the following signs and symptoms to the doctor's office immediately:    *Pain of jaw, arm, or neck  *Chest pain not relieved by medication                               *Dizziness or loss of consciousness  *Difficulty breathing even when at rest   *More tired than usual                                       *Bleeding drainage or swelling of surgical site  *Swelling of feet, ankles, legs or stomach                 *Fever (>100ºF)  *Pink or blood tinged sputum  *Weight gain (3lbs/day or 5lbs /week)           *Shock from internal defibrillator (if applicable)  *Palpitations or irregular  heartbeats                *Cool and/or numb extremities    Stroke Awareness    Common Risk Factors for Stroke include:    Age  Atrial Fibrillation  Carotid Artery Stenosis  Diabetes Mellitus  Excessive alcohol consumption  High blood pressure  Overweight   Physical inactivity  Smoking    Warning signs and symptoms of a stroke include:    *Sudden numbness or weakness of the face, arm or leg (especially on one side of the body).  *Sudden confusion, trouble speaking or understanding.  *Sudden trouble seeing in one or both eyes.  *Sudden trouble walking, dizziness, loss of balance or coordination.Sudden severe headache with no known cause.    It is very important to get treatment quickly when a stroke occurs. If you experience any of the above warning signs, call 911 immediately.                   Disclaimer         Quit Smoking / Tobacco Use:    I understand the use of any tobacco products increases my chance of suffering from future heart disease or stroke and could cause other illnesses which may shorten my life. Quitting the use of tobacco products is the single most important thing I can do to improve my health. For further information on smoking / tobacco cessation call a Toll Free Quit Line at 1-573.166.3712 (*[employer] [place]) or 1-325.231.9560 (American Lung Association) or you can access the web based program at www.lungusa.org.    [place] Tobacco Users Help Line:  (229) 123-7381       Toll Free: 1-920.780.6522  Quit Tobacco Program [facility] (342)018-4748    Crisis Hotline:    [place] Crisis Hotline:  1-925-XKLQFQS or 1-708.955.6030    [place] Crisis Hotline:    1-482.879.3051 or 848-318-0944    Discharge Survey:   Thank you for choosing [facility]. We hope we did everything we could to make your hospital stay a pleasant one. You may be receiving a phone survey and we would appreciate your time and participation in answering the questions. Your input is very valuable to us  in our efforts to improve our service to our patients and their families.        My signature on this form indicates that:    1. I have reviewed and understand the above information.  2. My questions regarding this information have been answered to my satisfaction.  3. I have formulated a plan with my discharge nurse to obtain my prescribed medications for home.                  Disclaimer         __________________________________                     __________       ________                       Patient Signature                                                 Date                    Time

## 2017-05-10 NOTE — ED NOTES
Chief Complaint   Patient presents with   • Shortness of Breath     for a couple weeks per pt, albuterol and duoneb TX PTA, O2 sat 90% on 3L, uses home O2   • Difficulty Breathing

## 2017-05-10 NOTE — IP AVS SNAPSHOT
Fundera Access Code: [email or web address]  Expires: 5/15/2017 10:02 AM    Your email address is not on file at [facility].  Email Addresses are required for you to sign up for Fundera, please contact [phone] to verify your personal information and to provide your email address prior to attempting to register for Fundera.    Rosey Walker   [address]  [place] [address]    Fundera  A secure, online tool to manage your health information     [facility]’s Fundera® is a secure, online tool that connects you to your personalized health information from the privacy of your home -- day or night - making it very easy for you to manage your healthcare. Once the activation process is completed, you can even access your medical information using the Fundera sandy, which is available for free in the Apple Sandy store or Google Play store.     To learn more about Fundera, visit [email or web address]    There are two levels of access available (as shown below):   My Chart Features  [facility] Primary Care Doctor [facility]  Specialists [facility]  Urgent  Care Non-[facility] Primary Care Doctor   Email your healthcare team securely and privately 24/7 X X X    Manage appointments: schedule your next appointment; view details of past/upcoming appointments X      Request prescription refills. X      View recent personal medical records, including lab and immunizations X X X X   View health record, including health history, allergies, medications X X X X   Read reports about your outpatient visits, procedures, consult and ER notes X X X X   See your discharge summary, which is a recap of your hospital and/or ER visit that includes your diagnosis, lab results, and care plan X X  X     How to register for Fundera:  Once your e-mail address has been verified, follow the following steps to sign up for Fundera.     1. Go to  [email or web address]  2. Click on the Sign Up Now box, which takes you to the New Member Sign Up page. You will  need to provide the following information:  a. Enter your [employer] Access Code exactly as it appears at the top of this page. (You will not need to use this code after you’ve completed the sign-up process. If you do not sign up before the expiration date, you must request a new code.)   b. Enter your date of birth.   c. Enter your home email address.   d. Click Submit, and follow the next screen’s instructions.  3. Create a [employer] ID. This will be your [email or web address] login ID and cannot be changed, so think of one that is secure and easy to remember.  4. Create a [employer] password. You can change your password at any time.  5. Enter your Password Reset Question and Answer. This can be used at a later time if you forget your password.   6. Enter your e-mail address. This allows you to receive e-mail notifications when new information is available in Rankomat.pl.  7. Click Sign Up. You can now view your health information.    For assistance activating your [email or web address] account, call [phone]

## 2017-05-10 NOTE — ED PROVIDER NOTES
ED Provider Note    CHIEF COMPLAINT  Chief Complaint   Patient presents with   • Shortness of Breath     for a couple weeks per pt, albuterol and duoneb TX PTA, O2 sat 90% on 3L, uses home O2   • Difficulty Breathing       HPI  Rosey Walker is a 89 y.o. female with a history of idiopathic pulmonary fibrosis, interstitial lung disease, rheumatoid arthritis who presents with complaints of shortness of breath for the past 2 weeks. The patient normally is on oxygen 3 L, was noted to be borderline hypoxic with a oxygen saturation of 90%. EMS was called and the patient was transferred to emergency department for further evaluation. The patient denies any fever, chills, sore throat, she has had a mild cough which has been nonproductive. She denies any chest pain, or abdominal pain. She has had no nausea, vomit, or diarrhea.    REVIEW OF SYSTEMS  See HPI for further details. All other systems are negative.     PAST MEDICAL HISTORY  Past Medical History   Diagnosis Date   • RA (rheumatoid arthritis)    • IPF (idiopathic pulmonary fibrosis)    • Dyspnea    • Hypoxemia    • ILD (interstitial lung disease) 9/2/2016       FAMILY HISTORY  No family history on file.    SOCIAL HISTORY  Social History     Social History   • Marital Status:      Spouse Name: N/A   • Number of Children: N/A   • Years of Education: N/A     Social History Main Topics   • Smoking status: Never Smoker    • Smokeless tobacco: Never Used   • Alcohol Use: 0.0 oz/week     0 Standard drinks or equivalent per week      Comment: OCC   • Drug Use: No   • Sexual Activity: Not on file     Other Topics Concern   • Not on file     Social History Narrative       SURGICAL HISTORY  Past Surgical History   Procedure Laterality Date   • Cataract extraction with iol     • Colon resection         CURRENT MEDICATIONS  Home Medications     **Home medications have not yet been reviewed for this encounter**          ALLERGIES  Allergies   Allergen Reactions   • Anectine  "       PHYSICAL EXAM  VITAL SIGNS: Pulse 121  Resp 30  Ht 1.549 m (5' 0.98\")  Wt 36.741 kg (81 lb)  BMI 15.31 kg/m2  SpO2 96%  Constitutional: Awake, alert, in no acute distress, Non-toxic appearance.   HENT: Atraumatic. Bilateral external ears normal, mucous membranes moist, throat nonerythematous without exudates, nose is normal.  Eyes: PERRL, EOMI, conjunctiva moist, noninjected.  Neck: Nontender, Normal range of motion, No nuchal rigidity, No stridor.   Lymphatic: No lymphadenopathy noted.   Cardiovascular: Regular rate and rhythm, no murmurs, rubs, gallops.  Thorax & Lungs: Diminished breath sounds bilaterally, crackles about half way up from the bases, mild expiratory wheeze, no retractions.  No chest tenderness.  Abdomen: Bowel sounds normal, Soft, nontender, nondistended, no rebound, guarding, masses.  Back: No CVA or spinal tenderness.  Extremities: Intact distal pulses, No edema, No tenderness.   Skin: Warm, Dry, No rashes.   Musculoskeletal: No joint swelling or tenderness.  Neurologic: Alert & oriented x 3, sensory and motor function normal. No focal deficits.   Psychiatric: Affect normal, Judgment normal, Mood normal.     EKG  Twelve-lead EKG shows a sinus tachycardia, rate of 123, occasional PVC, normal intervals, left axis deviation, no acute ST wave elevations or ST depressions, no pathologic Q waves, no evidence of an acute injury or ischemic pattern on my reading, there is no previous EKG for comparison at this time.        RADIOLOGY/PROCEDURES  DX-CHEST-PORTABLE (1 VIEW)   Final Result      Overall improvement in consolidation      Stable findings compatible with interstitial lung disease. Superimposed edema or other consolidation is not excluded            COURSE & MEDICAL DECISION MAKING  Pertinent Labs & Imaging studies reviewed. (See chart for details)  The patient presents with the above complaints. She has diminished breath sounds and diffuse crackles likely secondary to her " interstitial lung disease. She was given a Atrovent/albuterol nebulizer treatment. She was given Solu-Medrol 80 mg IV. Chest x-ray shows no acute infiltrate. EKG shows a sinus tachycardia. CBC shows a white count of 11,000, normal differential, hemoglobin 10.7, chemistry shows a glucose of 135, albumin 2.4, otherwise normal, troponin less than 0.02, BNP 87. The patient was to be tachycardic on reexamination. She continued diminished breath sounds and remained tachypneic. She was given a liter bolus of normal saline.  Case was discussed with Dr. Nunez for admission.  Because of her tachypnea, and tachycardia, a d-dimer was obtained which was elevated at 1766. Subsequent CT angiogram of the chest was negative for pulmonary embolism, but did show severe bilateral interstitial disease with honeycombing consistent with usual interstitial pneumonia per radiology.    FINAL IMPRESSION  1. Exacerbation of chronic lung disease  2. Respiratory insufficiency  3. Anemia, chronic      Electronically signed by: Momo Barksdale, 5/10/2017 2:56 PM

## 2017-05-10 NOTE — IP AVS SNAPSHOT
" <p align=\"LEFT\"><IMG SRC=\"//[email or web address]$/[email or web address]\" alt=\"Image\" WIDTH=\"50%\" HEIGHT=\"200\" BORDER=\"\"></p>                   Name:Rosey Walker  Medical Record Number:[id number]  CSN: [id number]    YOB: 1926   Age: 89 y.o.  Sex: female  HT:1.549 m (5' 0.98\") WT: 48 kg (105 lb 13.1 oz)          Admit Date: 5/10/2017     Discharge Date:   Today's Date: 5/13/2017  Attending Doctor:  Reinier Gleason*                  Allergies:  Anectine          Your appointments     May 18, 2017 10:00 AM   New Patient with Gege Carrasco M.D.   [facility] [address] [place] [id number] [phone]           Please bring Photo ID, Insurance Cards, All Medication Bottles and copies of any legal documents (such as Living Will, Power of ) If speaking a language besides English please bring an adult . Please arrive 30 minutes prior for check in and registration. You will be receiving a confirmation call a few days before your appointment from our automated call confirmation system.                 Medication List      Take these Medications        Instructions    acetaminophen 325 MG Tabs   Commonly known as:  TYLENOL    Take 2 Tabs by mouth every 6 hours as needed (Mild Pain; (Pain scale 1-3); Temp greater than 100.5 F).   Dose:  650 mg       azithromycin 250 MG Tabs   Commonly known as:  ZITHROMAX    Take 1 Tab by mouth every day. One pill daily starting 5/14   Dose:  250 mg       COMBIVENT RESPIMAT  MCG/ACT Aers   Generic drug:  ipratropium-albuterol    Inhale 1 Puff by mouth 4 times a day as needed (SOB, COUGH, WHEEZE.  DO NOT EXCEED 6 PUFFS IN 24 HRS.).   Dose:  1 Puff       enoxaparin 30 MG/0.3ML Soln inj   Commonly known as:  LOVENOX    Inject 0.3 mL as instructed every day.   Dose:  30 mg       FOLIC ACID PO    Take  by mouth every day.       levofloxacin 250 MG Tabs   Commonly known as:  LEVAQUIN    Take 1 Tab by mouth every day.   Dose:  250 " mg       megestrol 40 MG/ML Susp   Commonly known as:  MEGACE    Take 20 mL by mouth every day.   Dose:  800 mg       methotrexate 2.5 MG tablet   Commonly known as:  RHEUMATREX    Take 15 mg by mouth every 7 days.   Dose:  15 mg       NORCO 5-325 MG Tabs per tablet   Generic drug:  hydrocodone-acetaminophen    Take 1-2 Tabs by mouth every four hours as needed.   Dose:  1-2 Tab       ondansetron 4 MG Tbdp   Commonly known as:  ZOFRAN ODT    Take 1 Tab by mouth every four hours as needed for Nausea/Vomiting (give PO if IV route is unavailable. May give per feeding tube.).   Dose:  4 mg       predniSONE 10 MG Tabs   Commonly known as:  DELTASONE    Take 0.5 Tabs by mouth every day.   Dose:  7 mg       tolterodine 2 MG Tabs   Commonly known as:  DETROL    Take 1 Tab by mouth 2 Times a Day.   Dose:  2 mg       VENTOLIN  (90 BASE) MCG/ACT Aers inhalation aerosol   Generic drug:  albuterol    Inhale 2 Puffs by mouth every four hours as needed for Shortness of Breath.   Dose:  2 Puff

## 2017-05-10 NOTE — ED NOTES
"Pt states \"I don't know my medications\".  Called daughter (Luana) @  [phone], left message  Will have [employer] tech follow up in the morning.   Pts daughter called back states \"Call Saundra Thao @ [phone], she would know her medications\".    "

## 2017-05-11 LAB
ANION GAP SERPL CALC-SCNC: 6 MMOL/L (ref 0–11.9)
BASOPHILS # BLD AUTO: 0.3 % (ref 0–1.8)
BASOPHILS # BLD: 0.02 K/UL (ref 0–0.12)
BUN SERPL-MCNC: 7 MG/DL (ref 8–22)
CALCIUM SERPL-MCNC: 8.9 MG/DL (ref 8.4–10.2)
CHLORIDE SERPL-SCNC: 103 MMOL/L (ref 96–112)
CO2 SERPL-SCNC: 31 MMOL/L (ref 20–33)
CREAT SERPL-MCNC: 0.47 MG/DL (ref 0.5–1.4)
EOSINOPHIL # BLD AUTO: 0 K/UL (ref 0–0.51)
EOSINOPHIL NFR BLD: 0 % (ref 0–6.9)
ERYTHROCYTE [DISTWIDTH] IN BLOOD BY AUTOMATED COUNT: 49.5 FL (ref 35.9–50)
GFR SERPL CREATININE-BSD FRML MDRD: >60 ML/MIN/1.73 M 2
GLUCOSE SERPL-MCNC: 162 MG/DL (ref 65–99)
HCT VFR BLD AUTO: 30.5 % (ref 37–47)
HGB BLD-MCNC: 9.5 G/DL (ref 12–16)
IMM GRANULOCYTES # BLD AUTO: 0.04 K/UL (ref 0–0.11)
IMM GRANULOCYTES NFR BLD AUTO: 0.5 % (ref 0–0.9)
LV EJECT FRACT  99904: 65
LV EJECT FRACT MOD 2C 99903: 63.83
LV EJECT FRACT MOD 4C 99902: 78.31
LV EJECT FRACT MOD BP 99901: 70.8
LYMPHOCYTES # BLD AUTO: 0.83 K/UL (ref 1–4.8)
LYMPHOCYTES NFR BLD: 11.3 % (ref 22–41)
MCH RBC QN AUTO: 29.5 PG (ref 27–33)
MCHC RBC AUTO-ENTMCNC: 31.1 G/DL (ref 33.6–35)
MCV RBC AUTO: 94.7 FL (ref 81.4–97.8)
MONOCYTES # BLD AUTO: 0.1 K/UL (ref 0–0.85)
MONOCYTES NFR BLD AUTO: 1.4 % (ref 0–13.4)
NEUTROPHILS # BLD AUTO: 6.34 K/UL (ref 2–7.15)
NEUTROPHILS NFR BLD: 86.5 % (ref 44–72)
NRBC # BLD AUTO: 0 K/UL
NRBC BLD AUTO-RTO: 0 /100 WBC
PLATELET # BLD AUTO: 250 K/UL (ref 164–446)
PMV BLD AUTO: 9.8 FL (ref 9–12.9)
POTASSIUM SERPL-SCNC: 3.8 MMOL/L (ref 3.6–5.5)
RBC # BLD AUTO: 3.22 M/UL (ref 4.2–5.4)
SODIUM SERPL-SCNC: 140 MMOL/L (ref 135–145)
WBC # BLD AUTO: 7.3 K/UL (ref 4.8–10.8)

## 2017-05-11 PROCEDURE — A9270 NON-COVERED ITEM OR SERVICE: HCPCS | Performed by: INTERNAL MEDICINE

## 2017-05-11 PROCEDURE — 97161 PT EVAL LOW COMPLEX 20 MIN: CPT

## 2017-05-11 PROCEDURE — 85025 COMPLETE CBC W/AUTO DIFF WBC: CPT

## 2017-05-11 PROCEDURE — 93306 TTE W/DOPPLER COMPLETE: CPT

## 2017-05-11 PROCEDURE — 80048 BASIC METABOLIC PNL TOTAL CA: CPT

## 2017-05-11 PROCEDURE — 770020 HCHG ROOM/CARE - TELE (206)

## 2017-05-11 PROCEDURE — 99233 SBSQ HOSP IP/OBS HIGH 50: CPT | Performed by: HOSPITALIST

## 2017-05-11 PROCEDURE — 700101 HCHG RX REV CODE 250: Performed by: INTERNAL MEDICINE

## 2017-05-11 PROCEDURE — 700105 HCHG RX REV CODE 258: Performed by: EMERGENCY MEDICINE

## 2017-05-11 PROCEDURE — G8991 OTHER PT/OT GOAL STATUS: HCPCS | Mod: CH

## 2017-05-11 PROCEDURE — 700102 HCHG RX REV CODE 250 W/ 637 OVERRIDE(OP): Performed by: INTERNAL MEDICINE

## 2017-05-11 PROCEDURE — 700105 HCHG RX REV CODE 258: Performed by: INTERNAL MEDICINE

## 2017-05-11 PROCEDURE — G8990 OTHER PT/OT CURRENT STATUS: HCPCS | Mod: CH

## 2017-05-11 PROCEDURE — 302146: Performed by: HOSPITALIST

## 2017-05-11 PROCEDURE — 700111 HCHG RX REV CODE 636 W/ 250 OVERRIDE (IP): Performed by: INTERNAL MEDICINE

## 2017-05-11 PROCEDURE — E0191 PROTECTOR HEEL OR ELBOW: HCPCS | Performed by: HOSPITALIST

## 2017-05-11 RX ADMIN — HEPARIN SODIUM 5000 UNITS: 5000 INJECTION, SOLUTION INTRAVENOUS; SUBCUTANEOUS at 20:08

## 2017-05-11 RX ADMIN — DOCUSATE SODIUM AND SENNOSIDES 2 TABLET: 8.6; 5 TABLET, FILM COATED ORAL at 20:08

## 2017-05-11 RX ADMIN — OXYBUTYNIN CHLORIDE 5 MG: 5 TABLET ORAL at 10:14

## 2017-05-11 RX ADMIN — MEGESTROL ACETATE 800 MG: 40 SUSPENSION ORAL at 10:14

## 2017-05-11 RX ADMIN — DEXTROSE MONOHYDRATE 500 MG: 50 INJECTION, SOLUTION INTRAVENOUS at 11:08

## 2017-05-11 RX ADMIN — SODIUM CHLORIDE: 9 INJECTION, SOLUTION INTRAVENOUS at 10:18

## 2017-05-11 RX ADMIN — OXYBUTYNIN CHLORIDE 5 MG: 5 TABLET ORAL at 20:08

## 2017-05-11 RX ADMIN — CEFTRIAXONE 2 G: 2 INJECTION, POWDER, FOR SOLUTION INTRAMUSCULAR; INTRAVENOUS at 10:15

## 2017-05-11 RX ADMIN — PREDNISONE 40 MG: 20 TABLET ORAL at 10:14

## 2017-05-11 RX ADMIN — HEPARIN SODIUM 5000 UNITS: 5000 INJECTION, SOLUTION INTRAVENOUS; SUBCUTANEOUS at 05:07

## 2017-05-11 ASSESSMENT — ENCOUNTER SYMPTOMS
VOMITING: 0
SHORTNESS OF BREATH: 0
NAUSEA: 0
ABDOMINAL PAIN: 0
DIARRHEA: 0
HEADACHES: 0
DIZZINESS: 0
BACK PAIN: 0
FEVER: 0
LOSS OF CONSCIOUSNESS: 0
CHILLS: 0
COUGH: 0

## 2017-05-11 ASSESSMENT — PAIN SCALES - GENERAL: PAINLEVEL_OUTOF10: 0

## 2017-05-11 ASSESSMENT — LIFESTYLE VARIABLES: DO YOU DRINK ALCOHOL: NO

## 2017-05-11 NOTE — ED NOTES
Attempted to call pt group residence, [facility] regarding pt home meds, call went to voicemail. Tried a different number, staff member said they will leave a message for caregiver to return out call.

## 2017-05-11 NOTE — DISCHARGE PLANNING
Medical Social Work    Referral: Pt discussed at IDT rounds this AM.    Intervention: Per flowsheet, pt lives at [facility] and expects to d.c there.  Per RN notes, pt's daughter, Luana signs all paperwork not pt.  There is no POA on file.  No SS needs identified nor any requests for RENAN Koch during rounds.      Plan: SW available for any assistance with d.c planning.    Care Transition Team Assessment    Information Source  Orientation : Disoriented to Time    Readmission Evaluation  Is this a readmission?: No    Elopement Risk  Legal Hold: No  Ambulatory or Self Mobile in Wheelchair: No-Not an Elopement Risk  Elopement Risk: Not at Risk for Elopement         Discharge Preparedness  What is your plan after discharge?: Uncertain - pending medical team collaboration  What are your discharge supports?: Child              Vision / Hearing Impairment  Right Eye Vision: Impaired  Left Eye Vision: Impaired  Hearing Impairment: Both Ears, Hearing Device Not Available         Advance Directive  Advance Directive?: None                   Anticipated Discharge Information  Anticipated discharge disposition: Group home, Other (comment) ([facility])

## 2017-05-11 NOTE — ED NOTES
Pt spoke with daughter via telephone, per daughter pt doesn't sign forms, states she will come in and sign admit paperwork.

## 2017-05-11 NOTE — PROGRESS NOTES
Left message with Saundra Thao [phone] to verify medications for Med Rec, waiting for call back

## 2017-05-11 NOTE — PROGRESS NOTES
2155: Pt arrived to the floor via gurney and transferred to the bed via slide board. Pt A&O to self only therefore admit profile unable to be completed. Pt assessed and photos taken of wounds.   0100: Pt sleeping with no s/s of distress.    0230: Pt sleeping with no s/s of distress.   0500: Pt cleaned of incontinent urine. Pictures retaken of wounds d/t incorrect laterality. Pt medicated per MD order.

## 2017-05-11 NOTE — PROGRESS NOTES
5/11/17, 1530- Microbiology called with positive blood culture showing gram positive cocci with possible streptococcus. Lab result called read back and verified to Dr. Reinier Gleason. No new orders received for patient at this time.

## 2017-05-11 NOTE — WOUND TEAM
"[facility]  Inpatient Services  Initial Wound & Skin Care Evaluation    Admission Date:  5/10/17         HPI, PMH, SH: Reviewed  Unit where seen by Wound Team:  [id number]    WOUND CONSULT RELATED TO: multiple areas of concern - BLE's and buttocks     SUBJECTIVE:  \"I tore my legs up a while ago.\"     Self Report / Pain Level: 2-3/10 with contact to left buttock wound     OBJECTIVE:  Pt supine in bed, dressings to LLE, mepilex over the buttocks     WOUND TYPE, LOCATION, CHARACTERISTICS (Pressure ulcers: location, stage, POA or date identified)    Wound Type/Location: left medial buttock, DTI, POA    Periwound:  Red, friable     Drainage:  Scant serosanguineous      Tissue Type and %:  80% red, 20% purple     Wound Edges:  open    Odor:  none     Exposed structure(s):  none   S&S of Infection:  none    Measurements: taken 5/11/17    Length:    9.0cm   Width:     6.0cm   Depth:    ua   Tracts/undermining:   none      INTERVENTIONS BY WOUND TEAM: Assessed BLE's, multiple scarred and resolving skin tears found to both legs.  Pt likes the adaptic and kerlix over the legs, so reapplied.  The left buttock presents with a DTI pressure injury POA.  NRSG to continue with mepilex sacral dressing.  AKUA bed and dressing maintenance orders placed.       Dressing types:  Mepilex sacral to backside     Interdisciplinary Collaboration: RN, Pt    EVALUATION:  Pt presents at this time with multiple resolved or nearly resolved skin tears to BLE's and a DTI to her left medial buttock.  NRSG OK to continue with adaptic and kerlix over scars for Pt comfort, but it isn't necessary at this time for healing as the sites are already scarred and resolved.  The buttock site presents with some areas of DTI and purple areas, Mepilex in use now and is appropriate.       Factors affecting wound healing:  Advanced age, decreased nutritional status, decreased mobility     Goals:  Wound to decrease in size by 1%/week     NURSING PLAN OF " CARE: (X)  Dressing changes: See Dressing Maintenance orders: X  Skin care: See Skin Care orders:   Rectal tube care: See Rectal Tube Care orders:   Other orders:    RSKIN: CURRENT (X) ORDERED (O)  Q shift Too:  X  Q shift pressure point assessments:  X  Atmosair X       AKUA O   Bariatric AKUA      Bariatric foam        Heel float boots       Heels floated on pillows  X    Barrier wipes      Barrier Cream      Barrier paste      Sacral silicone dressing   X   Silicone O2 tubing      Anchorfast      Trach with Optifoam split foam       Waffle cushion      Rectal tube or BMS      Antifungal tx    Turn q 2 hours X  Up to chair    Ambulate   PT/OT  X   Dietician X     PO  X   TF   TPN     PVN    NPO   # days   Other       WOUND TEAM PLAN OF CARE (X):   NPWT change 3 x week:        Dressing changes by wound team:       Follow up as needed:  X     Other (explain):    Anticipated discharge plans (X):  SNF:           Home Care:           Outpatient Wound Center:            Self Care:            Other: TBD

## 2017-05-11 NOTE — PROGRESS NOTES
Hospital Medicine Progress Note, Adult, Complex               Author: Reinier Gleason Date & Time created: 5/11/2017  1:00 PM     Interval History:  89yo with Hx of ILD, RA on prednisone 10mg and MTX presenting on 5/10 with cough, SOB and Dx of CAP    Pt c/o cough, no other complaints    Review of Systems:  Review of Systems   Constitutional: Negative for fever and chills.   Respiratory: Negative for cough and shortness of breath.    Cardiovascular: Negative for chest pain.   Gastrointestinal: Negative for nausea, vomiting, abdominal pain and diarrhea.   Musculoskeletal: Negative for back pain.   Skin: Negative for rash.   Neurological: Negative for dizziness, loss of consciousness and headaches.       Physical Exam:  Physical Exam   Constitutional: She is oriented to person, place, and time. She appears well-developed. She appears cachectic. No distress.   HENT:   Head: Normocephalic and atraumatic.   Neck: No JVD present.   Cardiovascular: Normal rate and regular rhythm.    Pulmonary/Chest: Effort normal. No stridor. No respiratory distress. She has no wheezes. She has rales.   Abdominal: Soft. There is no tenderness. There is no rebound and no guarding.   Musculoskeletal: She exhibits no edema.   Neurological: She is oriented to person, place, and time.   Skin: Skin is warm and dry. No rash noted. She is not diaphoretic.   Psychiatric: She has a normal mood and affect. Thought content normal.   Nursing note and vitals reviewed.      Labs:  Recent Labs      05/10/17   1500  05/10/17   2300   ISTATSPEC  Venous  Venous     Recent Labs      05/10/17   1500   TROPONINI  <0.02   BNPBTYPENAT  87     Recent Labs      05/10/17   1500  05/10/17   2300  05/11/17   0529   SODIUM  138   --   140   POTASSIUM  3.8   --   3.8   CHLORIDE  97   --   103   CO2  32   --   31   BUN  14   --   7*   CREATININE  0.57   --   0.47*   MAGNESIUM   --   1.7   --    CALCIUM  9.4   --   8.9     Recent Labs      05/10/17   1500   17   0529   ALTSGPT  12   --    ASTSGOT  28   --    ALKPHOSPHAT  62   --    TBILIRUBIN  0.8   --    GLUCOSE  135*  162*     Recent Labs      05/10/17   1500  17   0529   RBC  3.55*  3.22*   HEMOGLOBIN  10.7*  9.5*   HEMATOCRIT  33.3*  30.5*   PLATELETCT  288  250   PROTHROMBTM  13.8   --    APTT  30.0   --    INR  1.08   --      Recent Labs      05/10/17   1500  17   0529   WBC  11.0*  7.3   NEUTSPOLYS  57.40  86.50*   LYMPHOCYTES  33.90  11.30*   MONOCYTES  6.10  1.40   EOSINOPHILS  1.70  0.00   BASOPHILS  0.50  0.30   ASTSGOT  28   --    ALTSGPT  12   --    ALKPHOSPHAT  62   --    TBILIRUBIN  0.8   --            Hemodynamics:  Temp (24hrs), Av.8 °C (98.3 °F), Min:36.4 °C (97.5 °F), Max:37.4 °C (99.3 °F)  Temperature: 36.4 °C (97.5 °F)  Pulse  Av.3  Min: 96  Max: 126Heart Rate (Monitored): (!) 110  Blood Pressure : 102/54 mmHg, NIBP: 120/68 mmHg     Respiratory:    Respiration: 20, Pulse Oximetry: 96 %, O2 Daily Delivery Respiratory : Nasal Cannula     Given By:: Mouthpiece, Work Of Breathing / Effort: Mild  RUL Breath Sounds: Diminished, RML Breath Sounds: Diminished, RLL Breath Sounds: Crackles, IRLANDA Breath Sounds: Diminished, LLL Breath Sounds: Crackles  Fluids:    Intake/Output Summary (Last 24 hours) at 17 1300  Last data filed at 17 0800   Gross per 24 hour   Intake   1380 ml   Output      0 ml   Net   1380 ml     Weight: 48 kg (105 lb 13.1 oz)  GI/Nutrition:  Orders Placed This Encounter   Procedures   • Diet Order     Standing Status: Standing      Number of Occurrences: 1      Standing Expiration Date:      Order Specific Question:  Diet:     Answer:  Cardiac [6]     Medical Decision Making, by Problem:  Active Hospital Problems    Diagnosis   • CAP (community acquired pneumonia) [J18.9]  Rocephin/Zithro  Cultures pending   • Acute on chronic respiratory failure with hypoxemia (CMS-HCC) [J96.21]  Hx ILD  O2/Resp protocols  Steroids: 40mg prednisone, bseline 10mg pre  admit   • Severe protein-calorie malnutrition (CMS-HCC) [E43]  Dietary consult  megace   • Acute on chronic respiratory failure with hypoxia (CMS-[facility]) [J96.21]   • H/O rheumatoid arthritis [Z87.39]  Steroid dependent  MTX       Medications reviewed, Labs reviewed and Radiology images reviewed  Conrad catheter: No Conrad      DVT Prophylaxis: Heparin  DVT prophylaxis - mechanical: SCDs  Ulcer prophylaxis: Not indicated  Antibiotics: Treating active infection/contamination beyond 24 hours perioperative coverage

## 2017-05-11 NOTE — PROGRESS NOTES
Tele summary  Rhythm: SR/ ST  Rate: 70's-110's  WI: 0.16  QRS: 0.08  QT: 0.32    Ectopy: Occasional PVC/ PAC    Telemetry strips placed in pt chart.

## 2017-05-11 NOTE — H&P
ATTENDING PHYSICIAN:  Booker Nnuez M.D.    CHIEF COMPLAINT:  Shortness of breath.    HISTORY OF PRESENT ILLNESS:  This is a 89-year-old female with the past   medical history of idiopathic pulmonary fibrosis, chronic interstitial lung   disease, and rheumatoid arthritis who presented with worsening shortness of   breath for the past 1 week.  Patient is usually on 3 liters of oxygen and was   found to be hypoxic with oxygen saturation at 90%.  Patient also notes   productive cough.  She denies any fevers, chills, nausea, vomiting, abdominal   pain, or diarrhea.  She denies any chest pain.    In the ER, she was found to be tachycardic at 125 beats per minute and in   respiratory distress with 40 breaths per minute.  Her oxygen saturation was   92% on 4 liters.  Her blood pressure was 127/65.  Initial workup revealed   white count of 11 and hemoglobin of 10.7.  Her troponin was less than 0.02.    BNP was 87.  D-dimer was 1766.  A CTA of her chest revealed no pulmonary   embolus.  Severe basilar predominant interstitial lung disease with ____   consistent with usual interstitial pneumonia.  Ground glass opacities seen on   prior exam have improved.  Her EKG revealed sinus tachycardia with no ST   changes.  REVIEW OF SYSTEMS  A complete review of system was done. All other systems were negative.    PMH/PSH/FMH: I personally reviewed all ancillary histories as noted.    PAST MEDICAL HISTORY:  Past Medical History   Diagnosis Date   • RA (rheumatoid arthritis) (CMS-HCC)    • IPF (idiopathic pulmonary fibrosis) (CMS-HCC)    • Dyspnea    • Hypoxemia    • ILD (interstitial lung disease) (CMS-HCC) 9/2/2016       PAST SURGICAL HISTORY:  Past Surgical History   Procedure Laterality Date   • Cataract extraction with iol     • Colon resection         PERSONAL/SOCIAL HISTORY:  Social History   Substance Use Topics   • Smoking status: Never Smoker    • Smokeless tobacco: Never Used   • Alcohol Use: 0.0 oz/week     0 Standard drinks  or equivalent per week      Comment: OCC       FAMILY MEDICAL HISTORY:  History reviewed. No pertinent family history.    ALLERGIES:  Anectine    HOME MEDICATIONS:  Prior to Admission medications    Medication Sig Start Date End Date Taking? Authorizing Provider   predniSONE (DELTASONE) 10 MG Tab Take 0.5 Tabs by mouth every day. 1/11/17   Raiza Kline M.D.   tolterodine (DETROL) 2 MG Tab Take 1 Tab by mouth 2 Times a Day. 1/11/17   Raiza Kline M.D.   acetaminophen (TYLENOL) 325 MG Tab Take 2 Tabs by mouth every 6 hours as needed (Mild Pain; (Pain scale 1-3); Temp greater than 100.5 F). 1/11/17   Raiza Kline M.D.   enoxaparin (LOVENOX) 30 MG/0.3ML Solution inj Inject 0.3 mL as instructed every day. 1/11/17   Raiza Kline M.D.   megestrol (MEGACE) 40 MG/ML Suspension Take 20 mL by mouth every day. 1/11/17   Raiza Kline M.D.   ondansetron (ZOFRAN ODT) 4 MG TABLET DISPERSIBLE Take 1 Tab by mouth every four hours as needed for Nausea/Vomiting (give PO if IV route is unavailable. May give per feeding tube.). 1/11/17   Raiza Kline M.D.   FOLIC ACID PO Take  by mouth every day.    Not In System Provider   hydrocodone-acetaminophen (NORCO) 5-325 MG Tab per tablet Take 1-2 Tabs by mouth every four hours as needed.    Not In System Provider   albuterol (VENTOLIN HFA) 108 (90 BASE) MCG/ACT Aero Soln inhalation aerosol Inhale 2 Puffs by mouth every four hours as needed for Shortness of Breath.    Not In System Provider   ipratropium-albuterol (COMBIVENT RESPIMAT)  MCG/ACT Aero Soln Inhale 1 Puff by mouth 4 times a day as needed (SOB, COUGH, WHEEZE.  DO NOT EXCEED 6 PUFFS IN 24 HRS.).    Not In System Provider   methotrexate (RHEUMATREX) 2.5 MG tablet Take 15 mg by mouth every 7 days.    Not In System Provider         PHYSICAL EXAMINATION:  Vital Signs: Last Filed Vitals Signs: 24 Hour Range  Blood pressure 120/65, pulse 102, temperature 37.3 °C (99.2 °F), resp.  "rate 22, height 1.549 m (5' 0.98\"), weight 36.741 kg (81 lb), SpO2 93 %.    General: Elderly female in respiratory distress  HEENT: Atraumatic head, PERRLA. Mucous membranes moist.   Neck: Supple with no lymphadenopathy.  Respiratory: Increased effort. Bilateral fine crackles up to mid zone. Minimal wheezing.  Cardiovascular: Tachycardic and regular rhythm.  GI/Abdomen: Abdomen is soft, non tender and non distended. Positive bowel sounds in all four quadrants. No masses, organomegaly or hernias.  Extremities:  Radial and Dorsalis Pedis pulses 2+. No cyanosis or edema noted.   Neurology: CN II - XII intact, no focal deficits. Motor strength 5/5 bilaterally. Sensation intact and equal bilaterally.   Psychiatry: Alert and oriented to time, place and person. Normal mood and effect.  Skin: No rashes or ulcers seen. Capillary refill less than 3 seconds    SEPSIS REASSESSMENT:  CAPILLARY REFILL: brisk  PERIPHERAL PULSE: 1+  SKIN: normal    LAB DATA REVIEW:  Objective  Recent Results (from the past 24 hour(s))   EKG (ER)    Collection Time: 05/10/17  2:42 PM   Result Value Ref Range    Report       [facility] Emergency Dept.    Test Date:  2017-05-10  Pt Name:    GILDARDO DOBBINS                Department: [facility]  MRN:        [id number]                      [facility]  Gender:     F                            Technician: HAILEE  :        [id number]                   Requested By:RAGHU YAN  Order #:    [id number]                    Reading MD:    Measurements  Intervals                                Axis  Rate:       123                          P:          51  WI:         164                          QRS:        -31  QRSD:       78                           T:          4  QT:         297  QTc:        425    Interpretive Statements  Sinus tachycardia  Multiform ventricular premature complexes  Left atrial enlargement  Abnormal R-wave progression, late transition  Left ventricular " hypertrophy  No previous ECG available for comparison     Lactic acid (lactate)    Collection Time: 05/10/17  3:00 PM   Result Value Ref Range    Lactic Acid 1.68 0.50 - 2.00 mmol/L    Specimen Venous    CBC WITH DIFFERENTIAL    Collection Time: 05/10/17  3:00 PM   Result Value Ref Range    WBC 11.0 (H) 4.8 - 10.8 K/uL    RBC 3.55 (L) 4.20 - 5.40 M/uL    Hemoglobin 10.7 (L) 12.0 - 16.0 g/dL    Hematocrit 33.3 (L) 37.0 - 47.0 %    MCV 93.8 81.4 - 97.8 fL    MCH 30.1 27.0 - 33.0 pg    MCHC 32.1 (L) 33.6 - 35.0 g/dL    RDW 49.1 35.9 - 50.0 fL    Platelet Count 288 164 - 446 K/uL    MPV 9.6 9.0 - 12.9 fL    Neutrophils-Polys 57.40 44.00 - 72.00 %    Lymphocytes 33.90 22.00 - 41.00 %    Monocytes 6.10 0.00 - 13.40 %    Eosinophils 1.70 0.00 - 6.90 %    Basophils 0.50 0.00 - 1.80 %    Immature Granulocytes 0.40 0.00 - 0.90 %    Nucleated RBC 0.00 /100 WBC    Neutrophils (Absolute) 6.33 2.00 - 7.15 K/uL    Lymphs (Absolute) 3.73 1.00 - 4.80 K/uL    Monos (Absolute) 0.67 0.00 - 0.85 K/uL    Eos (Absolute) 0.19 0.00 - 0.51 K/uL    Baso (Absolute) 0.05 0.00 - 0.12 K/uL    Immature Granulocytes (abs) 0.04 0.00 - 0.11 K/uL    NRBC (Absolute) 0.00 K/uL   COMP METABOLIC PANEL    Collection Time: 05/10/17  3:00 PM   Result Value Ref Range    Sodium 138 135 - 145 mmol/L    Potassium 3.8 3.6 - 5.5 mmol/L    Chloride 97 96 - 112 mmol/L    Co2 32 20 - 33 mmol/L    Anion Gap 9.0 0.0 - 11.9    Glucose 135 (H) 65 - 99 mg/dL    Bun 14 8 - 22 mg/dL    Creatinine 0.57 0.50 - 1.40 mg/dL    Calcium 9.4 8.4 - 10.2 mg/dL    AST(SGOT) 28 12 - 45 U/L    ALT(SGPT) 12 2 - 50 U/L    Alkaline Phosphatase 62 30 - 99 U/L    Total Bilirubin 0.8 0.1 - 1.5 mg/dL    Albumin 2.4 (L) 3.2 - 4.9 g/dL    Total Protein 6.7 6.0 - 8.2 g/dL    Globulin 4.3 (H) 1.9 - 3.5 g/dL    A-G Ratio 0.6 g/dL   BTYPE NATRIURETIC PEPTIDE    Collection Time: 05/10/17  3:00 PM   Result Value Ref Range    B Natriuretic Peptide 87 0 - 100 pg/mL   TROPONIN    Collection Time:  05/10/17  3:00 PM   Result Value Ref Range    Troponin I <0.02 0.00 - 0.04 ng/mL   PROTHROMBIN TIME    Collection Time: 05/10/17  3:00 PM   Result Value Ref Range    PT 13.8 12.0 - 14.6 sec    INR 1.08 0.87 - 1.13   APTT    Collection Time: 05/10/17  3:00 PM   Result Value Ref Range    APTT 30.0 24.7 - 36.0 sec   ESTIMATED GFR    Collection Time: 05/10/17  3:00 PM   Result Value Ref Range    GFR If African American >60 >60 mL/min/1.73 m 2    GFR If Non African American >60 >60 mL/min/1.73 m 2   D-DIMER    Collection Time: 05/10/17  3:00 PM   Result Value Ref Range    D-Dimer Screen 1766 (H) <250 ng/mL(D-DU)   TSH (Thyroid Stimulating Hormone)    Collection Time: 05/10/17  3:00 PM   Result Value Ref Range    TSH 2.530 0.350 - 5.500 uIU/mL   Lactic Acid -STAT Once    Collection Time: 05/10/17 11:00 PM   Result Value Ref Range    Lactic Acid 2.10 (H) 0.50 - 2.00 mmol/L    Specimen Venous    MAGNESIUM    Collection Time: 05/10/17 11:00 PM   Result Value Ref Range    Magnesium 1.7 1.5 - 2.5 mg/dL          IMAGING REVIEW:  CT-CTA CHEST PULMONARY ARTERY W/ RECONS   Final Result      1.  No pulmonary embolus is identified.      2.  Severe basilar predominant interstitial lung disease with honeycombing, consistent with usual interstitial pneumonia.      3.  Groundglass opacities seen on the prior exam have improved.      4.  Atherosclerosis.               DX-CHEST-PORTABLE (1 VIEW)   Final Result      Overall improvement in consolidation      Stable findings compatible with interstitial lung disease. Superimposed edema or other consolidation is not excluded      Echocardiogram Comp W/O Cont    (Results Pending)         ASSESSMENT AND PLAN:  1.  Acute on chronic hypoxemic respiratory failure.  2.  Sepsis.  3.  Community-acquired pneumonia.  4.  Interstitial lung disease.  5.  Idiopathic pulmonary fibrosis.  6.  Rheumatoid arthritis.    PLAN:  The patient will be admitted to the telemetry unit with close cardiac   monitoring.   The patient has been started on IV fluids per septic protocol.    The patient has been started on IV azithromycin and ceftriaxone.  We will send   for blood cultures.  Patient has been given IV Solu-Medrol and will be   followed by prednisone 40 mg daily.  We will start the patient on DuoNebs on   breathing treatments and RT protocol.  We will provide the patient with   supplemental oxygen to aim for oxygen saturation greater than 93%.  We will   provide the patient with heparin subcutaneously 5000 units every 8 hours for   DVT prophylaxis.    CODE:  DNR/DNI.  I discussed the code status with the patient and she wants   DNR.       ____________________________________     MD STORM Castro / IVONNE    DD:  05/10/2017 23:06:50  DT:  05/10/2017 23:43:50    D#:  [id number]  Job#:  [id number]

## 2017-05-11 NOTE — ED NOTES
Assumed patient care. Pt assesement done.  Plan of care reviewed with patient. Pt repositioned in bed, denies any need at this time, will cot. Monitoring.

## 2017-05-12 PROCEDURE — 302112 WASHCLOTH,PERINEAL CARE: Performed by: HOSPITALIST

## 2017-05-12 PROCEDURE — 770020 HCHG ROOM/CARE - TELE (206)

## 2017-05-12 PROCEDURE — 700102 HCHG RX REV CODE 250 W/ 637 OVERRIDE(OP): Performed by: INTERNAL MEDICINE

## 2017-05-12 PROCEDURE — 700111 HCHG RX REV CODE 636 W/ 250 OVERRIDE (IP): Performed by: INTERNAL MEDICINE

## 2017-05-12 PROCEDURE — 700105 HCHG RX REV CODE 258: Performed by: INTERNAL MEDICINE

## 2017-05-12 PROCEDURE — 99232 SBSQ HOSP IP/OBS MODERATE 35: CPT | Performed by: HOSPITALIST

## 2017-05-12 PROCEDURE — 700101 HCHG RX REV CODE 250: Performed by: INTERNAL MEDICINE

## 2017-05-12 PROCEDURE — 94640 AIRWAY INHALATION TREATMENT: CPT

## 2017-05-12 PROCEDURE — A9270 NON-COVERED ITEM OR SERVICE: HCPCS | Performed by: INTERNAL MEDICINE

## 2017-05-12 RX ORDER — PREDNISONE 20 MG/1
20 TABLET ORAL DAILY
Status: DISCONTINUED | OUTPATIENT
Start: 2017-05-13 | End: 2017-05-13 | Stop reason: HOSPADM

## 2017-05-12 RX ADMIN — DOCUSATE SODIUM AND SENNOSIDES 2 TABLET: 8.6; 5 TABLET, FILM COATED ORAL at 08:43

## 2017-05-12 RX ADMIN — DEXTROSE MONOHYDRATE 500 MG: 50 INJECTION, SOLUTION INTRAVENOUS at 10:52

## 2017-05-12 RX ADMIN — MEGESTROL ACETATE 800 MG: 40 SUSPENSION ORAL at 08:43

## 2017-05-12 RX ADMIN — IPRATROPIUM BROMIDE AND ALBUTEROL SULFATE 3 ML: .5; 3 SOLUTION RESPIRATORY (INHALATION) at 13:53

## 2017-05-12 RX ADMIN — HEPARIN SODIUM 5000 UNITS: 5000 INJECTION, SOLUTION INTRAVENOUS; SUBCUTANEOUS at 16:36

## 2017-05-12 RX ADMIN — OXYBUTYNIN CHLORIDE 5 MG: 5 TABLET ORAL at 08:43

## 2017-05-12 RX ADMIN — ACETAMINOPHEN 650 MG: 325 TABLET, FILM COATED ORAL at 20:08

## 2017-05-12 RX ADMIN — HEPARIN SODIUM 5000 UNITS: 5000 INJECTION, SOLUTION INTRAVENOUS; SUBCUTANEOUS at 06:23

## 2017-05-12 RX ADMIN — CEFTRIAXONE 2 G: 2 INJECTION, POWDER, FOR SOLUTION INTRAMUSCULAR; INTRAVENOUS at 08:50

## 2017-05-12 RX ADMIN — PREDNISONE 40 MG: 20 TABLET ORAL at 08:43

## 2017-05-12 RX ADMIN — OXYBUTYNIN CHLORIDE 5 MG: 5 TABLET ORAL at 20:08

## 2017-05-12 RX ADMIN — HEPARIN SODIUM 5000 UNITS: 5000 INJECTION, SOLUTION INTRAVENOUS; SUBCUTANEOUS at 20:08

## 2017-05-12 ASSESSMENT — ENCOUNTER SYMPTOMS
VOMITING: 0
LOSS OF CONSCIOUSNESS: 0
COUGH: 0
DIARRHEA: 0
DIZZINESS: 0
NAUSEA: 0
BACK PAIN: 0
CHILLS: 0
ABDOMINAL PAIN: 0
SHORTNESS OF BREATH: 0
HEADACHES: 0
FEVER: 0

## 2017-05-12 ASSESSMENT — PAIN SCALES - GENERAL: PAINLEVEL_OUTOF10: 0

## 2017-05-12 NOTE — PROGRESS NOTES
Telemetry Summary    Rhythm Interpretation: Sinus Rhythm with occasional PVC's, PAC's, and Rare Couplets noted  Heart Rate: 90  VT Interval: 0.18  QRS Interval: 0.08  QT Interval: 0.36

## 2017-05-12 NOTE — PROGRESS NOTES
Received bedside report from PIO Johnson. Assumed care of pt who is awake in bed, RR even/unlabored. Requesting a snack and juice. Call light within reach.  Will CTM.     2000 NOC meds given per MAR. Assessment complete. Pt resting comfortably in bed; states no needs at this time. Bed in lowest position, call light w/in reach, will CTM.     2130 Pt agitated, confused and making many demands. Attempted to make pt more comfortable. Pt cleaned and mepilex to sacral area changed. q2 turns in place as pt will allow. Cabrera boots on and pillows used for positioning. Orders received from wound care to place pt on low air loss bed; per Hellen Lantiguag coord, pt currently on low air loss bed. Bed alarm on, bed in lowest position. Will CTM.     2230 Pt more calm now, pt clean and dry. Sacral mepilex changed.     0000 VSS; pt calm and cooperative now. Bed in lowest position, alarm on, states no further needs.     0400 Tele Note:    Rhythm: Sinus rhythm/ sinus tach  Rate: 80-low 100s; 4 bts of VT  KY: 0.14  QRS: 0.08  QT: 0.30  Ectopy: Frequent PVC/PACs, occasional couplets, rare trigem    0630 Bed bath given this AM. Pt repositioned for comfort.     0700 Patient report given to PIO Rodrigues. Safety precautions in place. Pt stable; no s/sx of distress noted.

## 2017-05-12 NOTE — CARE PLAN
Problem: Safety  Goal: Will remain free from injury  Outcome: PROGRESSING AS EXPECTED  Discussed w/ pt the importance of using call light for assistance. Pt calls appropriately occasionally but is confused. Call light and personal belongings w/ in reach, bed in lowest position, bed alarm on.        Problem: Skin Integrity  Goal: Risk for impaired skin integrity will decrease  Outcome: PROGRESSING AS EXPECTED  Q 2 turns in place; pt at high risk for skin break down. Barrier cream and moisturizer applied. Wound care following.

## 2017-05-12 NOTE — PROGRESS NOTES
Tele Note    Rhythm sr/st  Rate   NV 0.18  QRS 0.08  QT 0.34  Ectopy occassional coup, rare big/trig

## 2017-05-13 VITALS
TEMPERATURE: 97.3 F | OXYGEN SATURATION: 99 % | HEIGHT: 61 IN | DIASTOLIC BLOOD PRESSURE: 66 MMHG | RESPIRATION RATE: 18 BRPM | BODY MASS INDEX: 19.98 KG/M2 | HEART RATE: 107 BPM | WEIGHT: 105.82 LBS | SYSTOLIC BLOOD PRESSURE: 103 MMHG

## 2017-05-13 LAB
BACTERIA BLD CULT: ABNORMAL
BACTERIA BLD CULT: ABNORMAL
L PNEUMO IGG TITR SER IF: NORMAL {TITER}
SIGNIFICANT IND 70042: ABNORMAL
SITE SITE: ABNORMAL
SOURCE SOURCE: ABNORMAL

## 2017-05-13 PROCEDURE — 700101 HCHG RX REV CODE 250: Performed by: INTERNAL MEDICINE

## 2017-05-13 PROCEDURE — 700105 HCHG RX REV CODE 258: Performed by: INTERNAL MEDICINE

## 2017-05-13 PROCEDURE — A9270 NON-COVERED ITEM OR SERVICE: HCPCS | Performed by: INTERNAL MEDICINE

## 2017-05-13 PROCEDURE — 700111 HCHG RX REV CODE 636 W/ 250 OVERRIDE (IP): Performed by: HOSPITALIST

## 2017-05-13 PROCEDURE — 700102 HCHG RX REV CODE 250 W/ 637 OVERRIDE(OP): Performed by: HOSPITALIST

## 2017-05-13 PROCEDURE — 700102 HCHG RX REV CODE 250 W/ 637 OVERRIDE(OP): Performed by: INTERNAL MEDICINE

## 2017-05-13 PROCEDURE — 94640 AIRWAY INHALATION TREATMENT: CPT

## 2017-05-13 PROCEDURE — A9270 NON-COVERED ITEM OR SERVICE: HCPCS | Performed by: HOSPITALIST

## 2017-05-13 PROCEDURE — 700111 HCHG RX REV CODE 636 W/ 250 OVERRIDE (IP): Performed by: INTERNAL MEDICINE

## 2017-05-13 PROCEDURE — 99239 HOSP IP/OBS DSCHRG MGMT >30: CPT | Performed by: HOSPITALIST

## 2017-05-13 PROCEDURE — 94760 N-INVAS EAR/PLS OXIMETRY 1: CPT

## 2017-05-13 RX ORDER — LEVOFLOXACIN 250 MG/1
250 TABLET, FILM COATED ORAL DAILY
Qty: 4 TAB | Refills: 0 | Status: SHIPPED | OUTPATIENT
Start: 2017-05-13

## 2017-05-13 RX ORDER — AZITHROMYCIN 250 MG/1
250 TABLET, FILM COATED ORAL DAILY
Qty: 1 TAB | Refills: 0 | Status: SHIPPED | OUTPATIENT
Start: 2017-05-13

## 2017-05-13 RX ORDER — AZITHROMYCIN 250 MG/1
250 TABLET, FILM COATED ORAL ONCE
Status: COMPLETED | OUTPATIENT
Start: 2017-05-13 | End: 2017-05-13

## 2017-05-13 RX ADMIN — OXYBUTYNIN CHLORIDE 5 MG: 5 TABLET ORAL at 09:02

## 2017-05-13 RX ADMIN — MEGESTROL ACETATE 800 MG: 40 SUSPENSION ORAL at 09:01

## 2017-05-13 RX ADMIN — IPRATROPIUM BROMIDE AND ALBUTEROL SULFATE 3 ML: .5; 3 SOLUTION RESPIRATORY (INHALATION) at 14:07

## 2017-05-13 RX ADMIN — HEPARIN SODIUM 5000 UNITS: 5000 INJECTION, SOLUTION INTRAVENOUS; SUBCUTANEOUS at 15:06

## 2017-05-13 RX ADMIN — PREDNISONE 20 MG: 20 TABLET ORAL at 09:02

## 2017-05-13 RX ADMIN — AZITHROMYCIN 250 MG: 250 TABLET, FILM COATED ORAL at 12:43

## 2017-05-13 RX ADMIN — DEXTROSE MONOHYDRATE 500 MG: 50 INJECTION, SOLUTION INTRAVENOUS at 09:40

## 2017-05-13 RX ADMIN — CEFTRIAXONE 2 G: 2 INJECTION, POWDER, FOR SOLUTION INTRAMUSCULAR; INTRAVENOUS at 09:01

## 2017-05-13 NOTE — DISCHARGE PLANNING
Per [facility] request, transport arranged for patient to transfer to her group home at [id number] [place] today at 1730 via the [facility] van. [facility] has been advised of transport time.  Thea arranged per [facility] in [facility]  Transport.

## 2017-05-13 NOTE — DISCHARGE PLANNING
[facility] van set up for  at 1530.  SW notified bs PIO Cabrera, pt's daughter, Luana [phone] and pt's caregiver Mariaelena at [facility] [phone].

## 2017-05-13 NOTE — DISCHARGE PLANNING
Mariaelena is requesting medication for agitation and pain like Tylenol.  RENAN Koch spoke with [facility] RN [facility] who spoke with the caregiver.    RENAN preparing transport form to request transport for pt back to [facility] at [phone] [address].

## 2017-05-13 NOTE — PROGRESS NOTES
0700 Report received from [facility] nurse, Humaira, at bedside. Pt is resting in bed & eyes closed.    0820 Monitored heart rhythm is SR w rare PVCs, occasional PACs ((0.14/0.08/0.28), rate 80-90's.    1510 Pt, pt's daughter Luana (spoke over the phone) are informed about transport to group home @1730 today.    1715 Pt is transferred to group home by [facility] transport. Mepilex on coccyx has changed today.

## 2017-05-13 NOTE — DISCHARGE PLANNING
Medical Social Work    Referral: Pt discussed at IDT rounds this AM.    Intervention: Per flowsheet, pt lives at [facility] and expects to d.c home.  Pt to d.c back to  today.    Plan: SW available for any assistance with d.c planning.

## 2017-05-13 NOTE — DISCHARGE PLANNING
Left message with Saundra Thao [phone] and then RENAN harrell [phone] and left a voicemail .  Then RENAN Koch called [phone] and spoke with Mariaelena who said they cannot transport and would call Luana to discuss and either Mariaelena or pt's daughter, Luana would call RENAN back.

## 2017-05-13 NOTE — FLOWSHEET NOTE
05/13/17 1407   Events/Summary/Plan   Events/Summary/Plan pt c/o SOB, PRN nebulizer given   RT Assessment of Delivered Medications   Evaluation of Medication Delivery Daily Yes-- Pt /Family has been Instructed in use of Respiratory Medications and Adverse Reactions   SVN Group   #SVN Performed Yes   Given By: Mouthpiece   Chest Exam   Work Of Breathing / Effort Mild   Respiration 18   Pulse 95   Breath Sounds   RUL Breath Sounds Clear   RML Breath Sounds Clear   RLL Breath Sounds Fine Crackles;Diminished   IRLANDA Breath Sounds Clear   LLL Breath Sounds Fine Crackles;Diminished   Secretions   Cough Dry;Non Productive   Oximetry   #Pulse Oximetry (Single Determination) Yes   Oxygen   Home O2 Use Prior To Admission? Yes   Home O2 LPM Flow 3 LPM   Home O2 Delivery Method Nasal Cannula   Home O2 Frequency of Use Continuous   Pulse Oximetry 98 %   O2 (LPM) 3   O2 Daily Delivery Respiratory  Silicone Nasal Cannula

## 2017-05-13 NOTE — DISCHARGE PLANNING
RENAN Koch received a call from [facility] that Brian torres would like to  pt at 1630 instead of 1730.  RENAN Koch left a message with Junie for bs PIO Cabrera and then tried to contact Mariaelena the caregiver at [facility] but no voicemail was set up.

## 2017-05-13 NOTE — DISCHARGE PLANNING
RENAN Koch called pt's daughter about pt d.c today and she gave SW the number to [facility] [phone] and left voicemail about transporting pt back to [facility].  RENAN will follow up and call daughter once transportation has been confirmed.

## 2017-05-13 NOTE — DISCHARGE INSTRUCTIONS
Discharge Instructions    Discharged to home by car with [facility] transport. Discharged via wheelchair, hospital escort: Yes.  Special equipment needed: Not Applicable    Be sure to schedule a follow-up appointment with your primary care doctor or any specialists as instructed.     Discharge Plan:   Influenza Vaccine Indication: Patient Refuses    I understand that a diet low in cholesterol, fat, and sodium is recommended for good health. Unless I have been given specific instructions below for another diet, I accept this instruction as my diet prescription.   Other diet: a low fat and sodium    Special Instructions: Pneumonia, Adult  Pneumonia is an infection of the lungs.   CAUSES  Pneumonia may be caused by bacteria or a virus. Usually, the infection is caused by breathing in droplets from an infected person's cough or sneeze.   SYMPTOMS   Symptoms of pneumonia include:  · Cough.  · Fever.  · Chest pain.  · Rapid breathing.  · Shortness of breath.  · Shaking chills.  · Mucus production.  DIAGNOSIS   If you have the common symptoms of pneumonia, often your health care provider will confirm the diagnosis with a chest X-ray. The X-ray will show an abnormality in the lung if you have pneumonia. Other tests may be done on your blood, urine, or mucus (sputum) to find the specific cause of your pneumonia. A blood gas test or pulse oximetry test may be needed to check how well your lungs are working.  TREATMENT   Your treatment will depend on whether your pneumonia is caused by bacteria or a virus.   · Bacterial pneumonia is treated with antibiotic medicine.  · Pneumonia that is caused by the influenza virus may be treated with an antiviral medicine.  · Pneumonia that is caused by a virus other than influenza will not respond to antibiotic medicine. This type of pneumonia will have to run its course.   HOME CARE INSTRUCTIONS   · Cough suppressants may be used if you are losing too much rest from coughing at night. However,  you should try to avoid taking cough suppresants. This is because coughing helps to remove mucus from your lungs.  · Sleep in a semi-upright position at night. Try sleeping in a reclining chair, or place a few pillows under your head.  · Try using a cold steam vaporizer or humidifier in your home or bedroom. This may help loosen your mucus.  · If you were prescribed an antibiotic medicine, finish all of it even if you start to feel better.  · If you were prescribed an expectorant, take it as directed by your health care provider. This medicine loosens the mucus so you can cough it up.  · Take medicines only as directed by your health care provider.  · Do not smoke. If you are a smoker and continue to smoke, your cough may last several weeks after your pneumonia has cleared.  · Get rest when you feel tired, or as needed.  PREVENTION  A pneumococcal shot (vaccine) is available to prevent a common bacterial cause of pneumonia. This is usually suggested for:  · People over 65 years old.  · People on chemotherapy.  · People with chronic lung problems, such as bronchitis or emphysema.  · People with immune system problems.  If you are over 65 years old or have a high risk condition, you may receive the pneumococcal vaccine if you have not received it before. In some countries, a routine influenza vaccine is also recommended. This vaccine can help prevent some cases of pneumonia. You may be offered the influenza vaccine as part of your care.  If you are a smoker, it is time to quit in order to prevent pneumonia in the future. You may receive instructions on how to stop smoking. Your health care provider can provide medicines and counseling to help you quit.  SEEK MEDICAL CARE IF:  · You have a fever.  · You cannot control your cough with suppressants at night, and you keep losing sleep.  SEEK IMMEDIATE MEDICAL CARE IF:   · You have worsening shortness of breath.  · You have increased chest pain.  · Your sickness becomes  worse, especially if you are an older adult or have a weakened immune system.  · You cough up blood.  · You have pain that is getting worse or is not controlled with medicines.  · Your symptoms are getting worse rather than better.     This information is not intended to replace advice given to you by your health care provider. Make sure you discuss any questions you have with your health care provider.     Document Released: 12/18/2006 Document Revised: 01/08/2016 Document Reviewed: 04/13/2016  [employer] Interactive Patient Education ©2016 [employer]      · Is patient discharged on Warfarin / Coumadin?   No     · Is patient Post Blood Transfusion?  No    Depression / Suicide Risk    As you are discharged from this [facility] facility, it is important to learn how to keep safe from harming yourself.    Recognize the warning signs:  · Abrupt changes in personality, positive or negative- including increase in energy   · Giving away possessions  · Change in eating patterns- significant weight changes-  positive or negative  · Change in sleeping patterns- unable to sleep or sleeping all the time   · Unwillingness or inability to communicate  · Depression  · Unusual sadness, discouragement and loneliness  · Talk of wanting to die  · Neglect of personal appearance   · Rebelliousness- reckless behavior  · Withdrawal from people/activities they love  · Confusion- inability to concentrate     If you or a loved one observes any of these behaviors or has concerns about self-harm, here's what you can do:  · Talk about it- your feelings and reasons for harming yourself  · Remove any means that you might use to hurt yourself (examples: pills, rope, extension cords, firearm)  · Get professional help from the community (Mental Health, Substance Abuse, psychological counseling)  · Do not be alone:Call your Safe Contact- someone whom you trust who will be there for you.  · Call your local CRISIS HOTLINE 766-4638 or  478-868-4482  · Call your local Children's Mobile Crisis Response Team [place] (920) 361-4083 or www.gripNote.[employer]  · Call the toll free National Suicide Prevention Hotlines   · National Suicide Prevention Lifeline 720-914-TEZY (2568)  · National [place] Line Network 800-SUICIDE (512-4419)

## 2017-05-13 NOTE — PROGRESS NOTES
Pt sitting up in bed, only oriented to self, 3 liters O2 in use, no signs of distress.   Assessment completed and medications administered per MAR. Pt cleaned of incontinence and repositioned.  Pt later observed sleeping.

## 2017-05-14 NOTE — DISCHARGE SUMMARY
DATE OF ADMISSION:  05/10/2017    DATE OF DISCHARGE:  05/13/2017    DISCHARGE DIAGNOSES:  1.  Community-acquired pneumonia.  2.  Rheumatoid arthritis.  3.  The patient immunocompromise secondary to methotrexate and chronic   steroids.  4.  Acute on chronic hypoxic respiratory failure.  5.  Interstitial lung disease.    IMAGING STUDIES:  1.  Cardiac echo 05/11/2017.  Findings:  EF of 65% with normal regional wall   motion.  Moderate aortic stenosis.  Transvalvular gradient 47 mm peak, 30   mean.  Moderate aortic insufficiency.  Severe mitral annular calcification.    Mild mitral regurg and stenosis.  Moderate tricuspid regurgitation.  2.  CTA of the chest 05/10/2017.  Findings:  No PE identified.  Severe   bibasilar predominant interstitial lung disease with honeycombing consistent   with usual interstitial pneumonia, ground glass opacity seen on prior exam had   improved.  Atherosclerosis.    LABORATORY DATA:  On discharge, white count 7.3, hemoglobin 9.5, platelet   count 250.  Sodium 140, potassium 3.8, BUN 7, creatinine 0.47.  LFTs within   normal limits.  Lactic acid 2.1, troponin I 0.02.  BNP 87.  INR 1.08.  D-dimer   1766.  TSH 2.53, legionella titer less than 1.128.  Blood cultures 1 out of 2   bottles positive for strep viridans suspect contaminant.    HOSPITAL COURSE:  This is a 89-year-old female who has a significant medical   history of rheumatoid arthritis and interstitial lung disease, who is   maintained on methotrexate and low-dose steroids.  She lives in an assisted   living and presented for evaluation of worsening shortness of breath for about   1 week.  She was found to be hypoxic requiring 3 liters to maintain low 90%   sat in the ED.    The patient had a CT done which demonstrated possible pneumonia, and was   admitted for therapy.  She was treated with Rocephin and azithromycin.  Her   cultures were positive in 1 out of 2 bottles for strep viridans, which was   thought to be a contaminant.   Clinically, she did quite well and on the day of   discharge, appears to be back to her baseline with no complaint of shortness   of breath, though she still has persistent cough.    DISCHARGE MEDICATIONS:  1.  Levaquin 250 mg p.o. daily.  2.  Hydrocodone 5/325 1-2 q.4 hours p.r.n. pain.  3.  Methotrexate 2.5 m mg total every 7 days.  4.  P.r.n. Ventolin.  5.  Combivent p.r.n.  6.  Lovenox 30 mg subcutaneous daily.  7.  Zofran ODT 4 mg sublingual q. 4 hours p.r.n. pain.  8.  Megace 20 mg per day.  9.  Prednisone 10 mg p.o. daily.  10.  Folic acid supplement.  11.  Azithromycin 250 mg p.o. for 1 further day.  12.  Detrol-LA 2 mg p.o. daily.    FOLLOWUP:  The patient is to follow with her normal physicians in the next   week.  She is to come back to the hospital immediately if she develops any   worsening of her symptoms.  Discharge instructions have been reviewed with the   patient; however, she had some baseline cognitive changes which preclude full   comprehension.       ____________________________________     DO SCOTT Sunshine / IVONNE    DD:  2017 10:52:40  DT:  2017 21:46:53    D#:  [id number]  Job#:  [id number]

## 2017-05-15 LAB
BACTERIA BLD CULT: NORMAL
SIGNIFICANT IND 70042: NORMAL
SITE SITE: NORMAL
SOURCE SOURCE: NORMAL

## 2017-05-15 NOTE — DOCUMENTATION QUERY
DOCUMENTATION QUERY    PROVIDERS: Please select “Cosign w/ note”to reply to query.    To better represent the severity of illness of your patient, please review the following information and exercise your independent professional judgment in responding to this query.     Sepsis is documented in the History and Physical, but was later dropped as a diagnosis and not mentioned in the discharge summary. Based upon the clinical findings, risk factors, and treatment, can you please indicated if sepsis was present on admission or was ruled out.  Thank you.    • Sepsis was present on admission  • Sepsis has been ruled out  • Other explanation of clinical findings  • Unable to determine    The medical record reflects the following:   Clinical Findings 5/10 H/P: Sepsis, pneumonia; tachycardic at 125 bpm, respiratory rate of 40 bpm, wbc 11.0. The patient has been started on IV fluids per septic protocol. The patient has been started on IV azithromycin and ceftriaxone.  We will send for blood cultures is documented.    Lactic acid results noted on 5/10: 2.10  One of two blood cultures from 5/10 positive with Viridans streptococcus - possible contaminant   Treatment Sepsis protocol   Risk Factors  pneumonia, age, malnutrition   Location within medical record  History and Physical, Progress Notes and Lab Results      Thank you,   Kandice Benitez RN  Clinical Documentation Improvement  [phone]

## 2017-05-16 ENCOUNTER — PATIENT OUTREACH (OUTPATIENT)
Dept: HEALTH INFORMATION MANAGEMENT | Facility: OTHER | Age: 82
End: 2017-05-16

## 2017-05-16 NOTE — PROGRESS NOTES
· 5/16/17 at 10:30 AM--Received phone call from Mariaelena at [facility] s/p pt's hospital discharge of 5/13/17.  Mariaelena states that pt's zithromax Rx was never received.  Chart reviewed.  Pt had one remaining dose of zithromax s/p hospital discharge which was e-prescribed to [facility] in [facility].  Pt is residing in  in [facility].  Pt is also on levaquin and Mariaelena confirms that pt is taking levaquin as ordered.  Pt has a f/u appt on 5/18/17 with Dr. Carrasco at [facility].  Instructed Mariaelena to f/u with Dr. Carrasco to see if pt still needs last dose of zithromax since she is presently on levaquin.  Mariaelena verbalizes understanding.

## 2017-05-18 ENCOUNTER — APPOINTMENT (OUTPATIENT)
Dept: MEDICAL GROUP | Age: 82
End: 2017-05-18
Payer: MEDICARE

## 2017-08-30 LAB — EKG IMPRESSION: NORMAL

## 2018-08-31 NOTE — FLOWSHEET NOTE
05/12/17 1353   Events/Summary/Plan   Events/Summary/Plan Pt complaining of increased SOB.  PRN nebulizer treatment giiven.   RT Assessment of Delivered Medications   Evaluation of Medication Delivery Daily Yes-- Pt /Family has been Instructed in use of Respiratory Medications and Adverse Reactions   SVN Group   #SVN Performed Yes   Given By: Mouthpiece   Chest Exam   Work Of Breathing / Effort Mild;Increased Work of Breathing   Respiration 20   Pulse 98   Breath Sounds   RUL Breath Sounds Clear;Diminished   RML Breath Sounds Diminished   RLL Breath Sounds Fine Crackles;Diminished   IRLANDA Breath Sounds Clear;Diminished   LLL Breath Sounds Fine Crackles;Diminished   Oximetry   Continuous Oximetry Yes   Oxygen   Home O2 Use Prior To Admission? Yes   Home O2 LPM Flow 3 LPM   Home O2 Delivery Method Nasal Cannula   Home O2 Frequency of Use Continuous   Pulse Oximetry 99 %   O2 (LPM) 3   O2 Daily Delivery Respiratory  Silicone Nasal Cannula      Facts About Myopia    What is myopia?    Otherwise known as nearsightedness, myopia occurs when the eye grows too long from front to back. Instead of focusing images on the retina--the light-sensitive tissue in the back of the eye--the lens of the eye focuses the image in front of the retina. People with myopia have good near vision but poor distance vision.    People with myopia can typically see well enough to read a book or computer screen but struggle to see objects farther away. Sometimes people with undiagnosed myopia have headaches and eyestrain from struggling to clearly see things in the distance.     Myopia also can be the result of a cornea - the eyes outermost layer - that is too curved for the length of the eyeball or a lens that is too thick. With myopia, the eye is too long and focuses light in front of the retina.             In a normal eye, the light focuses on the retina. With myopia, the eye is too long and focuses light in front of the retina.    Why does the eyeball grow too long?    Scientists are unsure why the eyeball sometimes grows too long. In 2013, the Consortium for Refractive Error and Myopia (CREAM), an international team of vision scientists, discovered 24 new genetic risk factors for myopia.1 Some of these genes are involved in nerve cell function, metabolism, and eye development. Alone, each gene has a small influence on myopia risk; however, the researchers found that individuals carrying greater numbers of the myopia-prone versions of the genes have a up to tenfold increased risk of myopia.      Although genetics plays a role in myopia, the recent dramatic increase in the prevalence of myopia documented by several studies in the [place] and other countries points to environmental causes such as lack of time spent outdoors and greater amount of time spent doing near-work, such as reading, writing, and working on a computer.    In 1999, [facility]-funded researchers initiated the  Collaborative Longitudinal Evaluation of Ethnicity and Refractive Error (CLEERE),2 a long-term study following the eye development of more than 1,200 children ages 6 to 14, the age range during which myopia typically develops. [facility] researchers found that children who spent more time outdoors had a smaller chance of becoming nearsighted.3 The researchers also showed that time spent outside is independent from time spent reading, providing evidence against the assumption that less time outside means more time doing near work.    Researchers are unsure why time outdoors helps prevent the onset of myopia. Some suggest natural sunlight may provide important cues for eye development. Other researchers suggest that normal eye development may require sufficient time looking at distant objects. Curiously, once myopia has begun to develop, time outdoors does not appear to slow its progression, the researchers found.    What is high myopia?    Conventionally, an eye is considered to have high myopia if it requires -6.0 diopters or more of lens correction. Diopters indicate lens strength. High myopia increases the risk of retinal detachment. The retina is the tissue in the back part of the eye that signals the brain in response to light. When it detaches, it pulls away from the underlying tissue called the choroid. Blood from the choroid supplies the retina with oxygen and nutrients.    High myopia can also increase the risk of cataract and glaucoma. Cataract is the clouding of eyes lens. Glaucoma is a group of diseases that damage the optic nerve, which carries signals from the retina to the brain. Each of these conditions can cause vision loss.    Pathological myopia can cause damage to the retina, choroid, vitreous, and sclera.    Pathological myopia can cause damage to the retina, choroid, vitreous, and sclera.     What is pathological myopia?    A condition called pathological myopia (also called degenerative or  malignant myopia) sometimes occurs in eyes with high myopia when the excessive elongation of the eye causes changes in the retina, choroid, vitreous, sclera, and/or the optic nerve (see image). The vitreous is the gel-like substance that fills the center of the eye. The sclera is the outer white part of the eye.    Symptoms of pathological myopia typically first appear in childhood and usually worsen during adolescence and adulthood. Treatment cannot slow or stop elongation of the eye; however, complications such as retinal detachment, macular edema (build-up of fluid in the central part of the retina), choroidal neovascularization (abnormal blood vessel growth), and glaucoma usually can be treated.    How common is myopia?    About 42 percent of Americans ages 12-54 are nearsighted, up from 25 percent in 1971.4 A recent review5 reports that myopia prevalence varies by ethnicity. East Asians show the highest prevalence, reaching 69 percent at 15 years of age. Blacks in [place] had the lowest prevalence at 5.5 percent at 15 years of age. Children from urban environments are more than twice as likely to be myopic as those from rural environments.    How is myopia diagnosed?    An eye care professional can diagnose myopia during a comprehensive eye exam, which includes testing vision and examining the eye in detail. When possible, a comprehensive eye exam should include the use of dilating eyedrops to open the pupils wide for close examination of the optic nerve and retina.    How is myopia corrected?    The most common way to treat myopia is with corrective eyeglasses or contact lenses, which refocus light onto the retina. Contact lenses can cause complications (e.g., dry eye, corneal distortion, immunologic reaction, infection), but may be advantageous for activities where glasses are not practical (e.g., certain sports). An eye care professional can quickly identify lenses that best correct a patients vision using a  device called a phoropter. In younger children, a technique called retinoscopy helps the eye doctor determine the correction required. The results are written as a prescription.    Refractive surgery is an option once the optic error of the eye has stabilized, usually by the early 20s. The most common types of refractive surgery are laser-assisted in situ keratomileusis (LASIK) and photorefractive keratectomy (PRK). Both change the shape of the cornea to better focus light on the retina.    LASIK removes tissue from the inner layer of the cornea. To do this, a thin section of the outer corneal surface is cut and folded back to expose the inner cornea. A laser removes a precise amount of tissue to reshape the cornea and then the flap is placed back in position to heal. The correction possible with LASIK is limited by the amount of corneal tissue that can be safely removed.    PRK also removes a layer of corneal tissue, but does so without creating a surface flap. Instead, the corneal surface cells are removed prior to the laser procedure. For this reason, PRK requires a longer healing time, as the surface cells have to grow back to cover the corneal surface.  As with LASIK surgery, PRK is limited to how much tissue safely can be removed.      In the NEI-funded Patient Reported Outcomes with LASIK (PROWL) study, up to 28 percent of people experienced dry eye symptoms after LASIK.6 In the same study, up to 40 percent of patients undergoing LASIK experienced side effects such as ghosting of images, starbursts, glare, and halos, especially at night.  Nevertheless, less than 1 percent of patients experienced difficulty performing their usual activities following LASIK surgery due to any one symptom and 95 percent of participants said they were satisfied with their vision.7    Potential side effects of refractive surgery. Image [facility].    An important consideration for people considering refractive surgery  is that a nearsighted person who can comfortably read without glasses will likely require reading glasses if good distance vision is achieved through refractive surgery, so an individual who gets full distance correction with LASIK or PRK might be trading distance glasses for reading glasses.    Phakic intraocular lenses (IOLs) are an option for people who are very nearsighted or whose corneas are too thin to allow the use of laser procedures such as LASIK and PRK. Phakic lenses are surgically placed inside the eye to help focus light onto the retina.    1Verdc RAPHAEL et al., 2013. Genome-wide meta-analyses of multi-ancestry cohorts identify multiple new susceptibility loci for refractive error and myopia. Nature Genetics 45:314-318. doi:10.1038/ng.2554.    2CLEERE study: https://clinicaltrials.gov/ct2/show/ALF59765213 (link is external).    3JJAXON Guerrero et al. 2012. Time outdoors, visual activity, and myopia progression in juvenile-onset myopes. Clinical and Epidemiologic Research 53: 5623-7295.    4ViRAMIRO su et al. 2009. Increased prevalence of myopia in the [place] between 0279-2168 and 4104-3719. Arch Ophthalmol 127(12): 8472-8109.    5Runaveen NICOLAS, et al. 2016. Global variations and time trends in the prevalence of childhood myopia, a systematic review and quantitative meta-analysis: implications for aetiology and early prevention. [place] Journal of Ophthalmology 100(7):10.1136/gptmsaendtxf-4656-421679.      6Food and Drug Administration [Internet]. Marcus HINDS); LASIK Quality of Life Collaboration Project; reviewed 2017 September; cited 2017 September 29.     Available from: https://www.fda.gov/MedicalDevices/ProductsandMedicalProcedures/SurgeryandLifeSupport/LASIK/qse771459.htm (link is external)    7Food and Drug Administration [Internet]. Marcus HINDS); LASIK Quality of Life Collaboration Project; reviewed 2017 September; cited 2017 September 29. Available from:  https://www.fda.gov/MedicalDevices/ProductsandMedicalProcedures/SurgeryandLifeSupport/LASIK/vbl162890.htm (link is external)  Last Reviewed:   October 2017  The National [facility] [place] (NEI) is part of the National Institutes of Health ([facility]) and is the Federal governments lead agency for vision research that leads to sight-saving treatments and plays a key role in reducing visual impairment and blindness.    Myopia (Nearsightedness)    Nearsightedness, or myopia, as it is medically termed, is a vision condition in which close objects are seen clearly, but objects farther away appear blurred. Nearsightedness occurs if the eyeball is too long or the cornea, the clear front cover of the eye, has too much curvature. As a result, the light entering the eye isnt focused correctly and distant objects look blurred.    Generally, nearsightedness first occurs in school-age children. Because the eye continues to grow during childhood, it typically progresses until about age 20. However, nearsightedness may also develop in adults due to visual stress or health conditions such as diabetes.    A common sign of nearsightedness is difficulty with the clarity of distant objects like a movie or TV screen or the chalkboard in school. A comprehensive optometric examination will include testing for nearsightedness. An optometrist can prescribe eyeglasses or contact lenses that correct nearsightedness by bending the visual images that enter the eyes, focusing the images correctly at the back of the eye. Depending on the amount of nearsightedness, you may only need to wear glasses or contact lenses for certain activities, like watching a movie or driving a car. Or, if you are very nearsighted, they may need to be worn all the time.    What causes nearsightedness?    If one or both parents are nearsighted, there is an increased chance their children will be nearsighted.   The exact cause of nearsightedness is unknown, but two factors may  be primarily responsible for its development:  heredity   visual stress  There is significant evidence that many people inherit nearsightedness, or at least the tendency to develop nearsightedness. If one or both parents are nearsighted, there is an increased chance their children will be nearsighted.    Even though the tendency to develop nearsightedness may be inherited, its actual development may be affected by how a person uses his or her eyes. Individuals who spend considerable time reading, working at a computer, or doing other intense close visual work may be more likely to develop nearsightedness.    Nearsightedness may also occur due to environmental factors or other health problems:  Some people may experience blurred distance vision only at night. This night myopia may be due to the low level of light making it difficult for the eyes to focus properly or the increased pupil size during dark conditions, allowing more peripheral, unfocused light rays to enter the eye.   People who do an excessive amount of near vision work may experience a false or pseudo myopia. Their blurred distance vision is caused by over use of the eyes focusing mechanism. After long periods of near work, their eyes are unable to refocus to see clearly in the distance. The symptoms are usually temporary and clear distance vision may return after resting the eyes. However, over time constant visual stress may lead to a permanent reduction in distance vision.   Symptoms of nearsightedness may also be a sign of variations in blood sugar levels in persons with diabetes or an early indication of a developing cataract.  An optometrist can evaluate vision and determine the cause of the vision problems.      Courtesy of the American Optometric Association      Why Myopia Progression Is a Concern    By Huang Chinchilla, OD    Are your child's eyes getting worse year after year?  Some children who develop myopia (nearsightedness) have a  continual progression of their myopia throughout the school years, including high school. And while the cost of annual eye exams and new glasses every year can be a financial strain for some families, the long-term risks associated with myopia progression can be even greater.    More Children Are Becoming Nearsighted  Myopia is one of the most common eye disorders in the world. The prevalence of myopia is about 30 to 40 percent among adults in [place] and the [place], and up to 80 percent or higher in the  population, especially in [place].  And the incidence and prevalence of myopia are increasing. For example, in the early 1970s, only about 25 percent of Americans were nearsighted. But by 2004, myopia prevalence in the [place] had grown to nearly 42 percent of the population.    Classification of Myopia Severity  Myopia -- like all refractive errors -- is measured in diopters (D), which are the same units used to measure the optical power of eyeglasses and contact lenses.  Lens estrada that correct myopia are preceded by a minus sign (-), and are usually measured in 0.25 D increments.  The severity of nearsightedness is often categorized like this:  Mild myopia: -0.25 to -3.00 D   Moderate myopia: -3.25 to -6.00 D   High myopia: greater than -6.00 D  Mild myopia typically does not increase a person's risk for eye health problems. But moderate and high myopia sometimes are associated with serious, vision-threatening side effects. When this occurs in cases of high or very high myopia, the term degenerative myopia or pathological myopia sometimes is used.  People who end up having high myopia as adults usually start getting nearsighted when they are young children, and their myopia progresses year after year.    Myopia progression: When your child wears glasses to see the board in class and needs stronger glasses year after year.      Children who love to read may be at greater risk for myopia  progression.   Myopia-Related Eye Problems  Here is a brief summary of significant eye problems that sometimes are associated with nearsightedness, particularly high myopia:  Myopia and cataracts. In a study published in 2011 of cataracts and cataract surgery outcomes among [place] with high myopia, researchers found cataracts tended to develop sooner in highly myopic eyes compared with normal eyes. And eyes with high myopia had a higher prevalence of coexisting disease and complications, such as retinal detachment.    Also, visual outcomes following cataract surgery were not as good among highly nearsighted eyes.    In an [place] study of more than 3,600 adults ages 49 to 97, the odds of having cataracts increased significantly with greater amounts of myopia.    Plus, the odds of having a particular type of cataract was twice as high among subjects with high myopia compared with those with low myopia.   Myopia and glaucoma. Myopia -- even mild and moderate myopia -- has been associated with an increased prevalence of glaucoma. In the same [place] study mentioned above, glaucoma was found in 4.2 percent of eyes with mild myopia and 4.4 percent of eyes with moderate-to-high myopia, compared with 1.5 percent of eyes without myopia.    The study authors concluded there is a strong relationship between myopia and glaucoma, and that nearsighted participants in the study had a two to three times greater risk of glaucoma than participants with no myopia.    Also, in a [place] study published in 2007, glaucoma was significantly associated with the severity of myopia. Among adults age 40 or older, those with high myopia had more than twice the odds of having glaucoma as study participants with moderate myopia, and more than three times the odds of individuals with mild myopia.    Compared with participants who either had no myopia or were farsighted, those with high myopia had a 4.2 to 7.6 times greater odds of having  glaucoma.        Myopia and retinal detachment. In a study published in American Journal of Epidemiology, researchers found myopia was a clear risk factor for retinal detachment.    Results showed eyes with mild myopia had a four-fold increased risk of retinal detachment compared with non-myopic eyes. Among eyes with moderate and high myopia, the risk increased 10-fold.    The study authors also concluded that almost 55 percent of retinal detachments not caused by trauma are attributable to myopia.    In the [place] study mentioned above, among participants with high myopia due to elongated eye shape (axial myopia), the incidence of retinal detachment after cataract surgery was 1.72 percent, compared with 0.28 percent among participants with normal eye shape.    In a study conducted in the [place] of the incidence of retinal detachment after cataract surgery, 2.4 percent of highly myopic eyes developed a detached retina within seven years following cataract extraction, compared with an incidence of 0.5 to 1 percent among eyes of any refractive error that underwent cataract surgery.     Myopia and refractive surgery. Also, many people with high myopia are not well-suited for LASIK or other laser refractive surgery. (Highly myopic individuals may still be good candidates for phakic IOL implantation or other vision correction procedures, however.)    What You Can Do About Myopia Progression  The best thing you can do to help slow the progression of your child's myopia is to schedule annual eye exams so your eye doctor can monitor how much and how fast his or her eyes are changing.  Often, children with myopia don't complain about their vision, so be sure to schedule annual exams even if they say their vision seems fine.  If your child's eyes are changing rapidly or regularly, ask your eye doctor about  myopia control measures to slow the progression of nearsightedness.       About the Author: Huang Chinchilla, OD, is senior   of [employer]. Dr. 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 His special interests include contact lenses, nutrition and preventive vision care. Connect with Dr. Chinchilla via [employer].

## 2019-11-14 NOTE — DISCHARGE SUMMARY
----- Message from Flores Jaimes RN sent at 11/14/2019  4:25 PM CST -----  Please notify the patient of stable results.  Strong heart function, Follow-up as planned.   DISCHARGE DIAGNOSES:  1.  Fall with multiple skin tears.  2.  Chronic obstructive pulmonary disease.  3.  Acute on chronic respiratory failure with hypoxemia.  4.  Chronic interstitial lung disease.  5.  History of rheumatoid arthritis.  6.  Chronic steroid use.  7.  Hypophosphatemia.  8.  Physical debility, chronic.  9.  Severe protein calorie malnutrition and poor appetite.  10.  Bandemia.    HOSPITAL COURSE:  This is a 89-year-old female with history of severe   end-stage lung disease on chronic prednisone and oxygen, who had a fall at   home and suffers significant skin tear on the lateral edge of her right leg.    She was mildly altered from baseline.  She was brought here for further   evaluation.  She was also followed by wound care for her skin tears.  She   underwent physical and occupational therapy who is recommending that patient   be rehabbed in a skilled nursing facility before she can safely be returned to   her home due to her frailty during her hospital stay.  She was noted to have   poor oral intake and was started on Megace.  She suffers from severe   protein-calorie malnutrition and has loss of muscle mass and subcutaneous fat.    Her albumin is 2.5.  She has multiple ecchymoses and small tears in addition   to the tear with which she presented, all of these were present on admission   and are likely secondary to her fall.  Patient is at risk for decubitus ulcer   due to her poor nutritional status and prominent bony areas should be   monitored accordingly for development of pressure ulcers.  Skin tears should   monitor for signs and symptoms of infection, but there is no sign of that   here.  To her skin tear she should have petrolatum gauze applied and then foam   to absorb and protect the wound, it should be changed at least 3 times a   week.    DISCHARGE MEDICATIONS:  She is to be on Tylenol 650 q. 6 hours p.r.n. mild    pain or fever, Lovenox 30 mg subcutaneously daily, Megace 800 mg orally  daily,   Zofran 4 mg q. 4 hours p.r.n. nausea or vomiting, prednisone 7.7 mg p.o.   daily, Detrol 2 mg b.i.d., Combivent Respimat 1 puff 4 times a day as needed,   folate 1 tablet daily, methotrexate 15 mg every 7 days, Norco 5/325 one tablet   q. 4 hours p.r.n., Ventolin HFA 2 puffs q. 4 hours p.r.n. shortness of   breath.    DIET:  Regular with Boost supplements or equivalent 3 times a day, her intake   should be monitored.  She should have weights at least 3 times per week to   make sure that she is improving.  She should have a comprehensive metabolic   profile with albumin at least weekly to monitor her serum albumin as stated   she is a high risk for pressure ulcers.  She is high risk for falls.  Code   status is full code.  Plan is for discharge to home with daughter or possibly   to assisted living facility depending on her progress at skilled nursing.  She   is on chronic oxygen at 4 liters nasal cannula.    Discharge preparation time is 36 minutes.       ____________________________________     MD SHELLY MAIER / IVONNE    DD:  01/11/2017 16:46:06  DT:  01/11/2017 17:13:55    D#:  [id number]  Job#:  [id number]

## 2020-09-29 NOTE — PROGRESS NOTES
Hospital Medicine Progress Note, Adult, Complex               Author: Reinier Gleason Date & Time created: 5/12/2017  1:11 PM     Interval History:  89yo with Hx of ILD, RA on prednisone 10mg and MTX presenting on 5/10 with cough, SOB and Dx of CAP    Feeling better, less cough.  No c/o pain.      Review of Systems:  Review of Systems   Constitutional: Negative for fever and chills.   Respiratory: Negative for cough and shortness of breath.    Cardiovascular: Negative for chest pain.   Gastrointestinal: Negative for nausea, vomiting, abdominal pain and diarrhea.   Musculoskeletal: Negative for back pain.   Skin: Negative for rash.   Neurological: Negative for dizziness, loss of consciousness and headaches.       Physical Exam:  Physical Exam   Constitutional: She is oriented to person, place, and time. She appears well-developed. She appears cachectic. No distress.   HENT:   Head: Normocephalic and atraumatic.   Neck: No JVD present.   Cardiovascular: Normal rate and regular rhythm.    Pulmonary/Chest: Effort normal. No stridor. No respiratory distress. She has no wheezes. She has rales.   Abdominal: Soft. There is no tenderness. There is no rebound and no guarding.   Musculoskeletal: She exhibits no edema.   Neurological: She is oriented to person, place, and time.   Skin: Skin is warm and dry. No rash noted. She is not diaphoretic.   Psychiatric: She has a normal mood and affect. Thought content normal.   Nursing note and vitals reviewed.      Labs:  Recent Labs      05/10/17   1500  05/10/17   2300   ISTATSPEC  Venous  Venous     Recent Labs      05/10/17   1500   TROPONINI  <0.02   BNPBTYPENAT  87     Recent Labs      05/10/17   1500  05/10/17   2300  05/11/17   0529   SODIUM  138   --   140   POTASSIUM  3.8   --   3.8   CHLORIDE  97   --   103   CO2  32   --   31   BUN  14   --   7*   CREATININE  0.57   --   0.47*   MAGNESIUM   --   1.7   --    CALCIUM  9.4   --   8.9     Recent Labs      05/10/17    1500  17   0529   ALTSGPT  12   --    ASTSGOT  28   --    ALKPHOSPHAT  62   --    TBILIRUBIN  0.8   --    GLUCOSE  135*  162*     Recent Labs      05/10/17   1500  17   0529   RBC  3.55*  3.22*   HEMOGLOBIN  10.7*  9.5*   HEMATOCRIT  33.3*  30.5*   PLATELETCT  288  250   PROTHROMBTM  13.8   --    APTT  30.0   --    INR  1.08   --      Recent Labs      05/10/17   1500  17   0529   WBC  11.0*  7.3   NEUTSPOLYS  57.40  86.50*   LYMPHOCYTES  33.90  11.30*   MONOCYTES  6.10  1.40   EOSINOPHILS  1.70  0.00   BASOPHILS  0.50  0.30   ASTSGOT  28   --    ALTSGPT  12   --    ALKPHOSPHAT  62   --    TBILIRUBIN  0.8   --            Hemodynamics:  Temp (24hrs), Av.4 °C (97.5 °F), Min:36.3 °C (97.3 °F), Max:36.5 °C (97.7 °F)  Temperature: 36.4 °C (97.5 °F)  Pulse  Av  Min: 85  Max: 126  Blood Pressure : 122/74 mmHg     Respiratory:    Respiration: 18, Pulse Oximetry: 98 %     Work Of Breathing / Effort: Mild  RUL Breath Sounds: Diminished, RML Breath Sounds: Diminished, RLL Breath Sounds: Crackles, IRLANDA Breath Sounds: Diminished, LLL Breath Sounds: Crackles  Fluids:    Intake/Output Summary (Last 24 hours) at 17 1311  Last data filed at 17 0900   Gross per 24 hour   Intake    640 ml   Output      0 ml   Net    640 ml        GI/Nutrition:  Orders Placed This Encounter   Procedures   • Diet Order     Standing Status: Standing      Number of Occurrences: 1      Standing Expiration Date:      Order Specific Question:  Diet:     Answer:  Cardiac [6]     Medical Decision Making, by Problem:  Active Hospital Problems    Diagnosis   • CAP (community acquired pneumonia) [J18.9]  Rocephin/Zithro  Cultures 1/2 bottles /10 +Strep species   • Acute on chronic respiratory failure with hypoxemia (CMS-HCC) [J96.21]  Hx ILD  O2/Resp protocols  Baseline 10mg prednisone  Start taper today   • Severe protein-calorie malnutrition (CMS-HCC) [E43]  Dietary consult  megace   • Acute on chronic respiratory failure  with hypoxia (CMS-[facility]) [J96.21]   • H/O rheumatoid arthritis [Z87.39]  Steroid dependent  MTX       Medications reviewed, Labs reviewed and Radiology images reviewed  Conrad catheter: No Conrad      DVT Prophylaxis: Heparin  DVT prophylaxis - mechanical: SCDs  Ulcer prophylaxis: Not indicated  Antibiotics: Treating active infection/contamination beyond 24 hours perioperative coverage           negative Soft, non-tender, no hepatosplenomegaly, normal bowel sounds

## 2021-01-14 DIAGNOSIS — Z23 NEED FOR VACCINATION: ICD-10-CM

## 2021-05-21 NOTE — PROGRESS NOTES
Bedside report received from Mana RN, pt awake and alert sitting with HOB elevated and no c/o, oxygen by NC, IV s/l, brief is dry, dressing to RLE is CDI, fall precautions in place with alarm and pt calls for assistance.   PRINCIPAL DISCHARGE DIAGNOSIS  Diagnosis: Aneurysm of internal iliac artery  Assessment and Plan of Treatment: Wound Care: